# Patient Record
Sex: FEMALE | Race: WHITE | NOT HISPANIC OR LATINO | Employment: OTHER | URBAN - METROPOLITAN AREA
[De-identification: names, ages, dates, MRNs, and addresses within clinical notes are randomized per-mention and may not be internally consistent; named-entity substitution may affect disease eponyms.]

---

## 2023-08-30 PROBLEM — M13.0 POLYARTHRITIS: Status: ACTIVE | Noted: 2023-08-30

## 2023-08-30 PROBLEM — M81.0 POSTMENOPAUSAL OSTEOPOROSIS: Status: ACTIVE | Noted: 2023-08-30

## 2023-08-30 PROBLEM — R07.81 CHEST PAIN, PLEURITIC: Status: ACTIVE | Noted: 2023-08-30

## 2023-08-30 PROBLEM — E78.2 MIXED HYPERLIPIDEMIA: Status: ACTIVE | Noted: 2023-08-30

## 2023-08-30 PROBLEM — N18.9 CHRONIC RENAL FAILURE SYNDROME: Status: ACTIVE | Noted: 2023-08-30

## 2023-08-30 PROBLEM — M70.60 GREATER TROCHANTERIC BURSITIS: Status: ACTIVE | Noted: 2023-08-30

## 2023-08-30 PROBLEM — E87.1 HYPONATREMIA: Status: ACTIVE | Noted: 2023-08-30

## 2023-08-30 PROBLEM — E53.8 VITAMIN B12 DEFICIENCY (NON ANEMIC): Status: ACTIVE | Noted: 2023-08-30

## 2023-08-30 PROBLEM — R42 DIZZINESS: Status: ACTIVE | Noted: 2023-08-30

## 2023-08-30 PROBLEM — E03.8 SUBCLINICAL HYPOTHYROIDISM: Status: ACTIVE | Noted: 2023-08-30

## 2023-08-30 PROBLEM — M25.519 SHOULDER PAIN: Status: ACTIVE | Noted: 2023-08-30

## 2023-08-30 PROBLEM — F03.90 DEMENTIA (MULTI): Status: ACTIVE | Noted: 2023-08-30

## 2023-08-30 PROBLEM — M47.817 SPONDYLOSIS OF LUMBOSACRAL SPINE WITHOUT MYELOPATHY: Status: ACTIVE | Noted: 2023-08-30

## 2023-08-30 PROBLEM — M54.50 LOW BACK PAIN: Status: ACTIVE | Noted: 2023-08-30

## 2023-08-30 PROBLEM — M25.559 HIP PAIN: Status: ACTIVE | Noted: 2023-08-30

## 2023-08-30 PROBLEM — R26.81 UNSTEADY GAIT: Status: ACTIVE | Noted: 2023-08-30

## 2023-08-30 PROBLEM — K21.9 GASTROESOPHAGEAL REFLUX DISEASE WITHOUT ESOPHAGITIS: Status: ACTIVE | Noted: 2023-08-30

## 2023-08-30 PROBLEM — K86.89 PANCREATIC INSUFFICIENCY (HHS-HCC): Status: ACTIVE | Noted: 2023-08-30

## 2023-08-30 PROBLEM — E22.2 SYNDROME OF INAPPROPRIATE SECRETION OF ANTIDIURETIC HORMONE (MULTI): Status: ACTIVE | Noted: 2023-08-30

## 2023-08-30 PROBLEM — I10 ESSENTIAL HYPERTENSION: Status: ACTIVE | Noted: 2023-08-30

## 2023-08-30 PROBLEM — M15.9 GENERALIZED OSTEOARTHRITIS: Status: ACTIVE | Noted: 2023-08-30

## 2023-08-30 PROBLEM — G47.33 OBSTRUCTIVE SLEEP APNEA SYNDROME: Status: ACTIVE | Noted: 2023-08-30

## 2023-08-30 PROBLEM — H35.3130 BILATERAL NONEXUDATIVE AGE-RELATED MACULAR DEGENERATION: Status: ACTIVE | Noted: 2023-08-30

## 2023-08-30 PROBLEM — F41.9 ANXIETY: Status: ACTIVE | Noted: 2023-08-30

## 2023-08-30 PROBLEM — N32.81 OVERACTIVE BLADDER: Status: ACTIVE | Noted: 2023-08-30

## 2023-08-30 PROBLEM — M19.019 ARTHRITIS OF SHOULDER REGION, DEGENERATIVE: Status: ACTIVE | Noted: 2023-08-30

## 2023-08-30 PROBLEM — I50.9 HEART FAILURE (MULTI): Status: ACTIVE | Noted: 2023-08-30

## 2023-08-30 PROBLEM — R33.9 URINARY RETENTION: Status: ACTIVE | Noted: 2023-08-30

## 2023-08-30 PROBLEM — H04.129 DRY EYE: Status: ACTIVE | Noted: 2023-08-30

## 2023-08-30 PROBLEM — M12.819 ROTATOR CUFF TEAR ARTHROPATHY: Status: ACTIVE | Noted: 2023-08-30

## 2023-08-30 PROBLEM — G31.84 MILD COGNITIVE IMPAIRMENT: Status: ACTIVE | Noted: 2023-08-30

## 2023-08-30 PROBLEM — R73.03 PREDIABETES: Status: ACTIVE | Noted: 2023-08-30

## 2023-08-30 PROBLEM — M75.100 ROTATOR CUFF TEAR ARTHROPATHY: Status: ACTIVE | Noted: 2023-08-30

## 2023-08-30 PROBLEM — M81.0 OSTEOPOROSIS: Status: ACTIVE | Noted: 2023-08-30

## 2023-08-30 PROBLEM — M54.9 BACKACHE: Status: ACTIVE | Noted: 2023-08-30

## 2023-08-30 PROBLEM — E55.9 VITAMIN D DEFICIENCY: Status: ACTIVE | Noted: 2023-08-30

## 2023-08-30 PROBLEM — R03.0 ELEVATED BLOOD PRESSURE READING WITHOUT DIAGNOSIS OF HYPERTENSION: Status: ACTIVE | Noted: 2023-08-30

## 2023-08-30 PROBLEM — M19.90 ARTHRITIS: Status: ACTIVE | Noted: 2023-08-30

## 2023-08-30 PROBLEM — I10 BENIGN HYPERTENSION: Status: ACTIVE | Noted: 2023-08-30

## 2023-08-30 RX ORDER — DICLOFENAC SODIUM 10 MG/G
GEL TOPICAL
COMMUNITY
Start: 2014-04-08

## 2023-08-30 RX ORDER — AMLODIPINE BESYLATE 10 MG/1
10 TABLET ORAL DAILY
COMMUNITY
End: 2023-10-19 | Stop reason: SDUPTHER

## 2023-08-30 RX ORDER — LISINOPRIL 5 MG/1
5 TABLET ORAL DAILY
COMMUNITY
Start: 2020-02-24 | End: 2023-10-31 | Stop reason: SDUPTHER

## 2023-08-30 RX ORDER — CHOLECALCIFEROL (VITAMIN D3) 125 MCG
CAPSULE ORAL
COMMUNITY
End: 2023-10-31 | Stop reason: WASHOUT

## 2023-08-30 RX ORDER — AZELASTINE HCL 205.5 UG/1
SPRAY NASAL
COMMUNITY
Start: 2013-06-25 | End: 2023-10-31 | Stop reason: WASHOUT

## 2023-08-30 RX ORDER — ACETAMINOPHEN 325 MG/1
TABLET ORAL
COMMUNITY
End: 2023-10-31 | Stop reason: WASHOUT

## 2023-08-30 RX ORDER — AMLODIPINE BESYLATE 2.5 MG/1
1 TABLET ORAL 2 TIMES DAILY
COMMUNITY
Start: 2016-07-15 | End: 2023-10-19 | Stop reason: SDUPTHER

## 2023-08-30 RX ORDER — TRIAMCINOLONE ACETONIDE 5 MG/G
CREAM TOPICAL
COMMUNITY
Start: 2017-02-07 | End: 2023-12-04 | Stop reason: ALTCHOICE

## 2023-08-30 RX ORDER — BETAMETHASONE DIPROPIONATE 0.5 MG/G
CREAM TOPICAL
COMMUNITY
Start: 2013-08-20 | End: 2023-10-31 | Stop reason: WASHOUT

## 2023-08-30 RX ORDER — METOPROLOL TARTRATE 50 MG/1
1 TABLET ORAL EVERY 12 HOURS
COMMUNITY
End: 2023-10-31 | Stop reason: WASHOUT

## 2023-08-30 RX ORDER — OXYBUTYNIN CHLORIDE 5 MG/1
1 TABLET, EXTENDED RELEASE ORAL DAILY
COMMUNITY
Start: 2016-09-13 | End: 2023-10-31 | Stop reason: WASHOUT

## 2023-08-30 RX ORDER — MIRTAZAPINE 15 MG/1
15 TABLET, FILM COATED ORAL NIGHTLY
COMMUNITY
End: 2023-10-31 | Stop reason: SDUPTHER

## 2023-08-30 RX ORDER — ALENDRONATE SODIUM 70 MG/1
TABLET ORAL
COMMUNITY
Start: 2018-08-07 | End: 2023-10-31 | Stop reason: WASHOUT

## 2023-08-30 RX ORDER — DONEPEZIL HYDROCHLORIDE 10 MG/1
1 TABLET, FILM COATED ORAL DAILY
COMMUNITY
End: 2023-10-31 | Stop reason: SDUPTHER

## 2023-08-30 RX ORDER — AMLODIPINE BESYLATE 5 MG/1
5 TABLET ORAL DAILY
COMMUNITY
End: 2023-10-19 | Stop reason: SDUPTHER

## 2023-08-30 RX ORDER — METFORMIN HYDROCHLORIDE 500 MG/1
TABLET, EXTENDED RELEASE ORAL
COMMUNITY
Start: 2022-04-06 | End: 2023-10-19 | Stop reason: SDUPTHER

## 2023-08-30 RX ORDER — DIAPER,BRIEF,INFANT-TODD,DISP
1 EACH MISCELLANEOUS 2 TIMES DAILY
COMMUNITY
End: 2023-10-31 | Stop reason: ALTCHOICE

## 2023-08-30 RX ORDER — VIT C/E/ZN/COPPR/LUTEIN/ZEAXAN 250MG-90MG
1 CAPSULE ORAL DAILY
COMMUNITY
End: 2023-10-31 | Stop reason: SDUPTHER

## 2023-08-30 RX ORDER — ALPRAZOLAM 0.5 MG/1
0.5 TABLET ORAL NIGHTLY PRN
COMMUNITY
End: 2023-10-31 | Stop reason: SDUPTHER

## 2023-08-30 RX ORDER — ASPIRIN 81 MG/1
1 TABLET ORAL DAILY
COMMUNITY
End: 2023-10-31 | Stop reason: WASHOUT

## 2023-08-30 RX ORDER — OXYCODONE AND ACETAMINOPHEN 5; 325 MG/1; MG/1
1 TABLET ORAL EVERY 4 HOURS PRN
COMMUNITY
End: 2023-10-31 | Stop reason: WASHOUT

## 2023-08-30 RX ORDER — MIRTAZAPINE 7.5 MG/1
1 TABLET, FILM COATED ORAL NIGHTLY
COMMUNITY
End: 2023-10-31 | Stop reason: WASHOUT

## 2023-08-30 RX ORDER — LISINOPRIL 10 MG/1
10 TABLET ORAL DAILY
COMMUNITY
End: 2023-10-31 | Stop reason: WASHOUT

## 2023-08-30 RX ORDER — METHYLPREDNISOLONE 4 MG/1
TABLET ORAL
COMMUNITY
End: 2023-10-31 | Stop reason: WASHOUT

## 2023-08-30 RX ORDER — FLUTICASONE PROPIONATE 50 MCG
SPRAY, SUSPENSION (ML) NASAL
COMMUNITY
Start: 2018-02-27

## 2023-08-30 RX ORDER — GABAPENTIN 100 MG/1
100 CAPSULE ORAL 3 TIMES DAILY
COMMUNITY
End: 2023-10-31 | Stop reason: WASHOUT

## 2023-08-30 RX ORDER — ACETAMINOPHEN 500 MG
TABLET ORAL
COMMUNITY
End: 2023-10-31 | Stop reason: WASHOUT

## 2023-08-30 RX ORDER — ATORVASTATIN CALCIUM 10 MG/1
10 TABLET, FILM COATED ORAL DAILY
COMMUNITY
Start: 2021-04-05 | End: 2023-10-19 | Stop reason: SDUPTHER

## 2023-08-30 RX ORDER — POLYETHYLENE GLYCOL 3350 17 G/17G
POWDER, FOR SOLUTION ORAL
COMMUNITY
End: 2023-10-31 | Stop reason: WASHOUT

## 2023-10-19 ENCOUNTER — PATIENT MESSAGE (OUTPATIENT)
Dept: PRIMARY CARE | Facility: CLINIC | Age: 83
End: 2023-10-19
Payer: MEDICARE

## 2023-10-19 DIAGNOSIS — I10 ESSENTIAL HYPERTENSION: ICD-10-CM

## 2023-10-19 DIAGNOSIS — E78.2 MIXED HYPERLIPIDEMIA: ICD-10-CM

## 2023-10-19 DIAGNOSIS — E11.69 TYPE 2 DIABETES MELLITUS WITH OTHER SPECIFIED COMPLICATION, WITHOUT LONG-TERM CURRENT USE OF INSULIN (MULTI): Primary | ICD-10-CM

## 2023-10-19 RX ORDER — METFORMIN HYDROCHLORIDE 500 MG/1
TABLET, EXTENDED RELEASE ORAL
Qty: 90 TABLET | Refills: 3 | Status: SHIPPED | OUTPATIENT
Start: 2023-10-19 | End: 2023-10-31 | Stop reason: WASHOUT

## 2023-10-19 RX ORDER — AMLODIPINE BESYLATE 5 MG/1
5 TABLET ORAL DAILY
Qty: 90 TABLET | Refills: 3 | Status: SHIPPED | OUTPATIENT
Start: 2023-10-19 | End: 2023-10-31 | Stop reason: SDUPTHER

## 2023-10-19 RX ORDER — ATORVASTATIN CALCIUM 10 MG/1
10 TABLET, FILM COATED ORAL DAILY
Qty: 90 TABLET | Refills: 3 | Status: SHIPPED | OUTPATIENT
Start: 2023-10-19 | End: 2023-10-31 | Stop reason: SDUPTHER

## 2023-10-19 NOTE — TELEPHONE ENCOUNTER
From: Liv Saucedo  To: Reese Short MD  Sent: 10/19/2023 9:45 AM EDT  Subject: Rx renewal    Please renew Metformin 500mg, Atorvastatin 10mg, and Amlodipine 5mg. TY.

## 2023-10-31 ENCOUNTER — OFFICE VISIT (OUTPATIENT)
Dept: PRIMARY CARE | Facility: CLINIC | Age: 83
End: 2023-10-31
Payer: MEDICARE

## 2023-10-31 VITALS
TEMPERATURE: 98 F | DIASTOLIC BLOOD PRESSURE: 60 MMHG | HEART RATE: 60 BPM | OXYGEN SATURATION: 97 % | BODY MASS INDEX: 25.24 KG/M2 | SYSTOLIC BLOOD PRESSURE: 116 MMHG | WEIGHT: 138 LBS

## 2023-10-31 DIAGNOSIS — I10 ESSENTIAL HYPERTENSION: ICD-10-CM

## 2023-10-31 DIAGNOSIS — F03.94 DEMENTIA WITH ANXIETY, UNSPECIFIED DEMENTIA SEVERITY, UNSPECIFIED DEMENTIA TYPE (MULTI): ICD-10-CM

## 2023-10-31 DIAGNOSIS — K21.9 GASTROESOPHAGEAL REFLUX DISEASE WITHOUT ESOPHAGITIS: ICD-10-CM

## 2023-10-31 DIAGNOSIS — M15.9 PRIMARY OSTEOARTHRITIS INVOLVING MULTIPLE JOINTS: ICD-10-CM

## 2023-10-31 DIAGNOSIS — G47.33 OBSTRUCTIVE SLEEP APNEA SYNDROME: ICD-10-CM

## 2023-10-31 DIAGNOSIS — F41.9 ANXIETY: ICD-10-CM

## 2023-10-31 DIAGNOSIS — E55.9 VITAMIN D DEFICIENCY: ICD-10-CM

## 2023-10-31 DIAGNOSIS — E78.2 MIXED HYPERLIPIDEMIA: ICD-10-CM

## 2023-10-31 DIAGNOSIS — R73.03 PREDIABETES: ICD-10-CM

## 2023-10-31 DIAGNOSIS — Z01.89 ENCOUNTER FOR ROUTINE LABORATORY TESTING: ICD-10-CM

## 2023-10-31 DIAGNOSIS — Z00.00 ANNUAL PHYSICAL EXAM: Primary | ICD-10-CM

## 2023-10-31 DIAGNOSIS — E53.8 VITAMIN B12 DEFICIENCY (NON ANEMIC): ICD-10-CM

## 2023-10-31 DIAGNOSIS — E03.8 SUBCLINICAL HYPOTHYROIDISM: ICD-10-CM

## 2023-10-31 DIAGNOSIS — Z71.89 COUNSELING REGARDING ADVANCED CARE PLANNING AND GOALS OF CARE: ICD-10-CM

## 2023-10-31 DIAGNOSIS — Z23 ENCOUNTER FOR IMMUNIZATION: ICD-10-CM

## 2023-10-31 PROBLEM — M15.0 PRIMARY OSTEOARTHRITIS INVOLVING MULTIPLE JOINTS: Status: ACTIVE | Noted: 2023-10-31

## 2023-10-31 PROCEDURE — G0439 PPPS, SUBSEQ VISIT: HCPCS | Performed by: STUDENT IN AN ORGANIZED HEALTH CARE EDUCATION/TRAINING PROGRAM

## 2023-10-31 PROCEDURE — 1036F TOBACCO NON-USER: CPT | Performed by: STUDENT IN AN ORGANIZED HEALTH CARE EDUCATION/TRAINING PROGRAM

## 2023-10-31 PROCEDURE — 1170F FXNL STATUS ASSESSED: CPT | Performed by: STUDENT IN AN ORGANIZED HEALTH CARE EDUCATION/TRAINING PROGRAM

## 2023-10-31 PROCEDURE — 1160F RVW MEDS BY RX/DR IN RCRD: CPT | Performed by: STUDENT IN AN ORGANIZED HEALTH CARE EDUCATION/TRAINING PROGRAM

## 2023-10-31 PROCEDURE — 3078F DIAST BP <80 MM HG: CPT | Performed by: STUDENT IN AN ORGANIZED HEALTH CARE EDUCATION/TRAINING PROGRAM

## 2023-10-31 PROCEDURE — 1159F MED LIST DOCD IN RCRD: CPT | Performed by: STUDENT IN AN ORGANIZED HEALTH CARE EDUCATION/TRAINING PROGRAM

## 2023-10-31 PROCEDURE — 3074F SYST BP LT 130 MM HG: CPT | Performed by: STUDENT IN AN ORGANIZED HEALTH CARE EDUCATION/TRAINING PROGRAM

## 2023-10-31 PROCEDURE — 1126F AMNT PAIN NOTED NONE PRSNT: CPT | Performed by: STUDENT IN AN ORGANIZED HEALTH CARE EDUCATION/TRAINING PROGRAM

## 2023-10-31 RX ORDER — VIT C/E/ZN/COPPR/LUTEIN/ZEAXAN 250MG-90MG
25 CAPSULE ORAL DAILY
Start: 2023-10-31

## 2023-10-31 RX ORDER — METFORMIN HYDROCHLORIDE 500 MG/1
500 TABLET, EXTENDED RELEASE ORAL
Start: 2023-10-31 | End: 2024-02-02 | Stop reason: SDUPTHER

## 2023-10-31 RX ORDER — MIRTAZAPINE 15 MG/1
15 TABLET, FILM COATED ORAL NIGHTLY
Start: 2023-10-31 | End: 2024-03-18 | Stop reason: WASHOUT

## 2023-10-31 RX ORDER — ATORVASTATIN CALCIUM 10 MG/1
10 TABLET, FILM COATED ORAL DAILY
Start: 2023-10-31 | End: 2024-02-02 | Stop reason: SDUPTHER

## 2023-10-31 RX ORDER — DONEPEZIL HYDROCHLORIDE 10 MG/1
10 TABLET, FILM COATED ORAL DAILY
Start: 2023-10-31 | End: 2024-03-25

## 2023-10-31 RX ORDER — LISINOPRIL 5 MG/1
5 TABLET ORAL DAILY
Start: 2023-10-31 | End: 2024-05-28

## 2023-10-31 RX ORDER — ALPRAZOLAM 0.5 MG/1
0.5 TABLET ORAL NIGHTLY PRN
Start: 2023-10-31

## 2023-10-31 RX ORDER — MEMANTINE HYDROCHLORIDE 10 MG/1
10 TABLET ORAL DAILY
COMMUNITY
Start: 2023-09-18 | End: 2023-10-31 | Stop reason: SDUPTHER

## 2023-10-31 RX ORDER — AMLODIPINE BESYLATE 5 MG/1
5 TABLET ORAL DAILY
Start: 2023-10-31 | End: 2024-02-02 | Stop reason: SDUPTHER

## 2023-10-31 RX ORDER — MEMANTINE HYDROCHLORIDE 10 MG/1
10 TABLET ORAL DAILY
Start: 2023-10-31 | End: 2024-05-07

## 2023-10-31 ASSESSMENT — PAIN SCALES - GENERAL: PAINLEVEL: 0-NO PAIN

## 2023-10-31 ASSESSMENT — ACTIVITIES OF DAILY LIVING (ADL)
DRESSING: INDEPENDENT
MANAGING_FINANCES: TOTAL CARE
GROCERY_SHOPPING: TOTAL CARE
TAKING_MEDICATION: TOTAL CARE
BATHING: INDEPENDENT
DOING_HOUSEWORK: NEEDS ASSISTANCE

## 2023-10-31 NOTE — PROGRESS NOTES
[facility]: [facility]  MEDICARE WELLNESS EXAM      Liv Saucedo is a 83 y.o. female that is presenting today for Annual Exam (CPE).    Assessment/Plan    Diagnoses and all orders for this visit:  Annual physical exam  Encounter for routine laboratory testing  Comments:  Patient is due for some nonfasting labwork. Ordered today.  Will order fasting labwork for the patient's next visit.  Orders:  -     CBC; Future  -     Basic Metabolic Panel; Future  -     Hepatic Function Panel; Future  -     TSH with reflex to Free T4 if abnormal; Future  -     CBC; Future  -     Basic Metabolic Panel; Future  -     Hepatic Function Panel; Future  -     Lipid Panel; Future  -     Hemoglobin A1C; Future  -     Vitamin D 25-Hydroxy,Total (for eval of Vitamin D levels); Future  -     TSH with reflex to Free T4 if abnormal; Future  -     Vitamin B12; Future  -     Folate; Future  Encounter for immunization  Comments:  Not due for any at this time.  Counseling regarding advanced care planning and goals of care  Dementia with anxiety, unspecified dementia severity, unspecified dementia type (CMS/[facility])  Comments:  Well-controlled. No changes at this time.  Orders:  -     donepezil (Aricept) 10 mg tablet; Take 1 tablet (10 mg) by mouth once daily.  -     memantine (Namenda) 10 mg tablet; Take 1 tablet (10 mg) by mouth once daily.  -     mirtazapine (Remeron) 15 mg tablet; Take 1 tablet (15 mg) by mouth once daily at bedtime.  Essential hypertension  Comments:  Well-controlled. No changes at this time.  Orders:  -     Basic Metabolic Panel; Future  -     amLODIPine (Norvasc) 5 mg tablet; Take 1 tablet (5 mg) by mouth once daily.  -     lisinopril 5 mg tablet; Take 1 tablet (5 mg) by mouth once daily.  -     Basic Metabolic Panel; Future  Prediabetes  -     Hemoglobin A1C; Future  -     metFORMIN  mg 24 hr tablet; Take 1 tablet (500 mg) by mouth once daily in the evening. Take with meals.  Mixed  hyperlipidemia  -     Hepatic Function Panel; Future  -     atorvastatin (Lipitor) 10 mg tablet; Take 1 tablet (10 mg) by mouth once daily.  -     Hepatic Function Panel; Future  -     Lipid Panel; Future  Subclinical hypothyroidism  -     TSH with reflex to Free T4 if abnormal; Future  -     TSH with reflex to Free T4 if abnormal; Future  Gastroesophageal reflux disease without esophagitis  Comments:  Well-controlled. No changes at this time.  Obstructive sleep apnea syndrome  -     CBC; Future  -     CBC; Future  Primary osteoarthritis involving multiple joints  Comments:  Well-controlled. No changes at this time.  Vitamin B12 deficiency (non anemic)  -     Vitamin B12; Future  -     Folate; Future  Vitamin D deficiency  -     cholecalciferol (Vitamin D-3) 25 MCG (1000 UT) capsule; Take 1 capsule (25 mcg) by mouth once daily.  -     Vitamin D 25-Hydroxy,Total (for eval of Vitamin D levels); Future  Anxiety  -     ALPRAZolam (Xanax) 0.5 mg tablet; Take 1 tablet (0.5 mg) by mouth as needed at bedtime for anxiety.  -     mirtazapine (Remeron) 15 mg tablet; Take 1 tablet (15 mg) by mouth once daily at bedtime.    ADVANCED CARE PLANNING  Advanced Care Planning was discussed with patient:  The patient has an active advanced care plan on file. The patient has an active surrogate decision-maker on file.  The patient was encouraged to ensure that any/all documentation is accurate and up to date, and that our office be provided a copy in the event that anything changes.    ACTIVITIES OF DAILY LIVING  Basic ADLs:  Bathing: Independent, Dressing: Independent, Toileting: Independent, Transferring: Independent, Continence: Independent, Feeding: Independent.    Instrumental ADLs:  Ability to use phone: Independent, Shopping: Requires Assistance, Cooking: Requires Assistance, House-keeping: Requires Assistance, Laundry: Requires Assistance, Transportation: Completely Dependent, Medication Management: Completely Dependent, Finance  Management: Completely Dependent.    Subjective   - The patient feels that she is noticing an improvement in her memory since neurology started the memantine; however, the patient's spouse does not notice this. Spouse states that the patient is not really getting better but also not really getting worse.  - The patient otherwise feels well and denies any acute symptoms or concerns at this time.  - The patient denies any changes or progression of their chronic medical problems.  - The patient denies any problems or concerns with their medications.      Review of Systems   Unable to perform ROS: Dementia     Objective   Vitals:    10/31/23 1029   BP: 116/60   Pulse: 60   Temp: 36.7 °C (98 °F)   SpO2: 97%      Body mass index is 25.24 kg/m².  Physical Exam  Vitals and nursing note reviewed.   Constitutional:       General: She is not in acute distress.     Appearance: Normal appearance. She is not ill-appearing.   HENT:      Head: Normocephalic and atraumatic.      Right Ear: Tympanic membrane, ear canal and external ear normal. There is no impacted cerumen.      Left Ear: Tympanic membrane, ear canal and external ear normal. There is no impacted cerumen.      Nose: Nose normal.      Mouth/Throat:      Mouth: Mucous membranes are moist.      Pharynx: Oropharynx is clear. No oropharyngeal exudate or posterior oropharyngeal erythema.   Eyes:      General: No scleral icterus.        Right eye: No discharge.         Left eye: No discharge.      Extraocular Movements: Extraocular movements intact.      Conjunctiva/sclera: Conjunctivae normal.      Pupils: Pupils are equal, round, and reactive to light.   Neck:      Vascular: No carotid bruit.   Cardiovascular:      Rate and Rhythm: Normal rate and regular rhythm.      Pulses: Normal pulses.      Heart sounds: Normal heart sounds. No murmur heard.     No friction rub. No gallop.   Pulmonary:      Effort: Pulmonary effort is normal. No respiratory distress.      Breath  "sounds: Normal breath sounds.   Abdominal:      General: Abdomen is flat. Bowel sounds are normal. There is no distension.      Palpations: Abdomen is soft.      Tenderness: There is no abdominal tenderness.      Hernia: No hernia is present.   Musculoskeletal:         General: No swelling or tenderness. Normal range of motion.   Lymphadenopathy:      Cervical: No cervical adenopathy.   Skin:     General: Skin is warm and dry.      Capillary Refill: Capillary refill takes less than 2 seconds.      Coloration: Skin is not jaundiced.      Findings: No rash.   Neurological:      General: No focal deficit present.      Mental Status: She is alert. Mental status is at baseline.   Psychiatric:         Mood and Affect: Mood normal.         Behavior: Behavior normal.       Diagnostic Results   Lab Results   Component Value Date    GLUCOSE 105 (H) 05/08/2023    CALCIUM 9.7 05/08/2023     05/08/2023    K 4.3 05/08/2023    CO2 23 (L) 05/08/2023    CL 98 05/08/2023    BUN 14 05/08/2023    CREATININE 0.9 05/08/2023     Lab Results   Component Value Date    ALT 16 05/08/2023    AST 18 05/08/2023    ALKPHOS 77 05/08/2023    BILITOT 0.5 05/08/2023     Lab Results   Component Value Date    WBC 6.2 05/08/2023    HGB 13.5 05/08/2023    HCT 39.9 05/08/2023    MCV 88.1 05/08/2023     05/08/2023     Lab Results   Component Value Date    CHOL 174 05/08/2023    CHOL 179 03/31/2022    CHOL 246 (H) 03/26/2021     Lab Results   Component Value Date    HDL 74 05/08/2023    HDL 73 03/31/2022    HDL 91 03/26/2021     Lab Results   Component Value Date    LDLCALC 76 05/08/2023    LDLCALC 86 03/31/2022    LDLCALC 128 03/26/2021     Lab Results   Component Value Date    TRIG 119 05/08/2023    TRIG 98 03/31/2022    TRIG 135 03/26/2021     No components found for: \"CHOLHDL\"  Lab Results   Component Value Date    HGBA1C 6.0 05/08/2023     Other labs not included in the list above reviewed either before or during this encounter.    History " "  No past medical history on file.  Past Surgical History:   Procedure Laterality Date    CT ABDOMEN ANGIOGRAM W AND/OR WO IV CONTRAST  6/19/2013    CT ABDOMEN ANGIOGRAM W AND/OR WO IV CONTRAST [facility]    TOTAL SHOULDER ARTHROPLASTY  07/15/2016    Shoulder Arthroplasty Total Shoulder Replacement     Family History   Problem Relation Name Age of Onset    Breast cancer Mother          cause of death    Multiple sclerosis Mother      Other (bladder cancer) Father          cause of death    Liver disease Sister          End stage    Obesity Sister      Diabetes Sister      Hypothyroidism Sister      Hashimoto's thyroiditis Sister      Osteoporosis Sister      Dystonia Sister          Cervical    Other (Vascular dementia) Other          Father's second cousin    Other (lewy body dementia) Other          Father's side    Breast cancer Other          sister     Social History     Socioeconomic History    Marital status:      Spouse name: Not on file    Number of children: Not on file    Years of education: Not on file    Highest education level: Not on file   Occupational History    Not on file   Tobacco Use    Smoking status: Never    Smokeless tobacco: Never   Substance and Sexual Activity    Alcohol use: Yes     Comment: occasionally    Drug use: Never    Sexual activity: Not on file   Other Topics Concern    Not on file   Social History Narrative    Not on file     Social Determinants of Health     Financial Resource Strain: Not on file   Food Insecurity: Not on file   Transportation Needs: Not on file   Physical Activity: Not on file   Stress: Not on file   Social Connections: Not on file   Intimate Partner Violence: Not on file   Housing Stability: Not on file     Allergies   Allergen Reactions    Lorazepam Hallucinations    Memantine Unknown    Naproxen Unknown    Nsaids (Non-Steroidal Anti-Inflammatory Drug) Other     \"hyponatremia    Sulfa (Sulfonamide Antibiotics) Other     mental status change "    Tetracyclines Other and Unknown     Nausea      Current Outpatient Medications on File Prior to Visit   Medication Sig Dispense Refill    alpha-D-galactosidase (BEANO ORAL) 1 tablets Orally three times a day with each meal      diclofenac sodium (Voltaren) 1 % gel gel as directed Externally Twice a day      fluticasone (Flonase) 50 mcg/actuation nasal spray 2 sprays Nasally Once a day      lubricating eye drops (polyvinyl alcohol-povidon,PF,) ophthalmic solution 2 drops each eye Ophthalmic bid      triamcinolone (Kenalog) 0.5 % cream 1 application to affected area Externally Twice a day prn      [DISCONTINUED] ALPRAZolam (Xanax) 0.5 mg tablet Take 1 tablet (0.5 mg) by mouth as needed at bedtime.  1 tablet oral three times a day PRN ANXIETY      [DISCONTINUED] amLODIPine (Norvasc) 5 mg tablet Take 1 tablet (5 mg) by mouth once daily. 90 tablet 3    [DISCONTINUED] atorvastatin (Lipitor) 10 mg tablet Take 1 tablet (10 mg) by mouth once daily. 90 tablet 3    [DISCONTINUED] calcium citrate-vitamin D3 250 mg-5 mcg (200 unit) tablet Take 1 tablet by mouth 2 times a day.      [DISCONTINUED] cholecalciferol (Vitamin D-3) 25 MCG (1000 UT) capsule Take 1 capsule (25 mcg) by mouth once daily.      [DISCONTINUED] donepezil (Aricept) 10 mg tablet Take 1 tablet (10 mg) by mouth once daily.      [DISCONTINUED] lisinopril 5 mg tablet Take 1 tablet (5 mg) by mouth once daily.      [DISCONTINUED] memantine (Namenda) 10 mg tablet Take 1 tablet (10 mg) by mouth once daily.      [DISCONTINUED] mirtazapine (Remeron) 15 mg tablet Take 1 tablet (15 mg) by mouth once daily at bedtime.      [DISCONTINUED] acetaminophen (Tylenol Extra Strength) 500 mg tablet 2 tablets as needed Orally Three times a day      [DISCONTINUED] acetaminophen (Tylenol) 325 mg tablet       [DISCONTINUED] alendronate (Fosamax) 70 mg tablet TAKE 1 TABLET ONCE WEEKLY.      [DISCONTINUED] aspirin 81 mg EC tablet Take 1 tablet (81 mg) by mouth once daily.       [DISCONTINUED] azelastine (Astepro Allergy) 205.5 mcg (0.15 %) spray,non-aerosol Astepro 0.15 % SOLN   Quantity: 120  Refills: 0        Start : 25-Jun-2013   Active      [DISCONTINUED] betamethasone dipropionate 0.05 % cream APPLY SPARINGLY TO AFFECTED AREA(S) ONCE DAILY      [DISCONTINUED] carboxymethylcellulose-glycern 1-0.9 % drops,gel Refresh Optive 1-0.9 % Ophthalmic Gel   Refills: 0        Start : 1-Aug-2018   Active      [DISCONTINUED] gabapentin (Neurontin) 100 mg capsule Take 1 capsule (100 mg) by mouth 3 times a day.      [DISCONTINUED] lactase (Lactaid) 3,000 unit tablet 1 tablet Orally three times a day with each meal      [DISCONTINUED] lisinopril 10 mg tablet Take 1 tablet (10 mg) by mouth once daily.      [DISCONTINUED] metFORMIN  mg 24 hr tablet 1 tablet with a meal Orally Once a day 90 tablet 3    [DISCONTINUED] methylPREDNISolone (Medrol, Bob,) 4 mg tablets 1 package oral daily      [DISCONTINUED] metoprolol tartrate (Lopressor) 50 mg tablet Take 1 tablet by mouth every 12 hours.      [DISCONTINUED] mirtazapine (Remeron) 7.5 mg tablet Take 1 tablet (7.5 mg) by mouth once daily at bedtime.      [DISCONTINUED] oxybutynin XL (Ditropan-XL) 5 mg 24 hr tablet Take 1 tablet (5 mg) by mouth once daily.      [DISCONTINUED] oxyCODONE-acetaminophen (Percocet) 5-325 mg tablet Take 1 tablet by mouth every 4 hours if needed for moderate pain (4 - 6).      [DISCONTINUED] polyethylene glycol (Miralax) 17 gram/dose powder as directed Orally       No current facility-administered medications on file prior to visit.     Immunization History   Administered Date(s) Administered    Flu vaccine, quadrivalent, high-dose, preservative free, age 65y+ (FLUZONE) 09/16/2021, 09/30/2022, 09/13/2023    Hep A, Unspecified 12/09/2005, 06/09/2006    Hepatitis A vaccine, pediatric/adolescent (HAVRIX, VAQTA) 01/25/2005    Influenza, High Dose Seasonal, Preservative Free 11/07/2016, 10/25/2017, 09/27/2018, 09/24/2019     Influenza, seasonal, injectable 10/22/2010, 10/19/2012, 10/28/2013, 10/30/2014    Influenza, trivalent, adjuvanted 09/10/2020    Moderna COVID-19 vaccine, Fall 2023, 12 yeasrs and older (50mcg/0.5mL) 10/11/2023    Moderna COVID-19 vaccine, bivalent, blue cap/gray label *Check age/dose* 09/14/2022    Moderna SARS-CoV-2 Vaccination 01/26/2021, 02/23/2021, 10/27/2021, 03/31/2022    Pneumococcal conjugate vaccine, 13-valent (PREVNAR 13) 12/02/2016    Pneumococcal polysaccharide vaccine, 23-valent, age 2 years and older (PNEUMOVAX 23) 11/10/2011    Tdap vaccine, age 7 year and older (BOOSTRIX) 07/30/2018    Zoster vaccine, recombinant, adult (SHINGRIX) 12/07/2018, 08/16/2019, 08/23/2019     Patient's medical history was reviewed and updated either before or during this encounter.    Reese Short MD

## 2023-10-31 NOTE — PATIENT INSTRUCTIONS
Diagnoses and all orders for this visit:  Annual physical exam  Encounter for routine laboratory testing  Comments:  Patient is due for some nonfasting labwork. Ordered today.  Will order fasting labwork for the patient's next visit.  Orders:  -     CBC; Future  -     Basic Metabolic Panel; Future  -     Hepatic Function Panel; Future  -     TSH with reflex to Free T4 if abnormal; Future  -     CBC; Future  -     Basic Metabolic Panel; Future  -     Hepatic Function Panel; Future  -     Lipid Panel; Future  -     Hemoglobin A1C; Future  -     Vitamin D 25-Hydroxy,Total (for eval of Vitamin D levels); Future  -     TSH with reflex to Free T4 if abnormal; Future  -     Vitamin B12; Future  -     Folate; Future  Encounter for immunization  Comments:  Not due for any at this time.  Counseling regarding advanced care planning and goals of care  Dementia with anxiety, unspecified dementia severity, unspecified dementia type (CMS/[facility])  Comments:  Well-controlled. No changes at this time.  Orders:  -     donepezil (Aricept) 10 mg tablet; Take 1 tablet (10 mg) by mouth once daily.  -     memantine (Namenda) 10 mg tablet; Take 1 tablet (10 mg) by mouth once daily.  -     mirtazapine (Remeron) 15 mg tablet; Take 1 tablet (15 mg) by mouth once daily at bedtime.  -     Follow Up In Primary Care; Future  Essential hypertension  Comments:  Well-controlled. No changes at this time.  Orders:  -     Basic Metabolic Panel; Future  -     amLODIPine (Norvasc) 5 mg tablet; Take 1 tablet (5 mg) by mouth once daily.  -     lisinopril 5 mg tablet; Take 1 tablet (5 mg) by mouth once daily.  -     Basic Metabolic Panel; Future  -     Follow Up In Primary Care; Future  Prediabetes  -     Hemoglobin A1C; Future  -     metFORMIN  mg 24 hr tablet; Take 1 tablet (500 mg) by mouth once daily in the evening. Take with meals.  Mixed hyperlipidemia  -     Hepatic Function Panel; Future  -     atorvastatin (Lipitor) 10 mg tablet; Take 1 tablet  (10 mg) by mouth once daily.  -     Hepatic Function Panel; Future  -     Lipid Panel; Future  Subclinical hypothyroidism  -     TSH with reflex to Free T4 if abnormal; Future  -     TSH with reflex to Free T4 if abnormal; Future  Gastroesophageal reflux disease without esophagitis  Comments:  Well-controlled. No changes at this time.  Obstructive sleep apnea syndrome  -     CBC; Future  -     CBC; Future  Primary osteoarthritis involving multiple joints  Comments:  Well-controlled. No changes at this time.  Vitamin B12 deficiency (non anemic)  -     Vitamin B12; Future  -     Folate; Future  Vitamin D deficiency  -     cholecalciferol (Vitamin D-3) 25 MCG (1000 UT) capsule; Take 1 capsule (25 mcg) by mouth once daily.  -     Vitamin D 25-Hydroxy,Total (for eval of Vitamin D levels); Future  Anxiety  -     ALPRAZolam (Xanax) 0.5 mg tablet; Take 1 tablet (0.5 mg) by mouth as needed at bedtime for anxiety.  -     mirtazapine (Remeron) 15 mg tablet; Take 1 tablet (15 mg) by mouth once daily at bedtime.

## 2023-11-02 ENCOUNTER — LAB (OUTPATIENT)
Dept: LAB | Facility: LAB | Age: 83
End: 2023-11-02
Payer: MEDICARE

## 2023-11-02 DIAGNOSIS — E78.2 MIXED HYPERLIPIDEMIA: ICD-10-CM

## 2023-11-02 DIAGNOSIS — I10 ESSENTIAL HYPERTENSION: ICD-10-CM

## 2023-11-02 DIAGNOSIS — G47.33 OBSTRUCTIVE SLEEP APNEA SYNDROME: ICD-10-CM

## 2023-11-02 DIAGNOSIS — E03.8 SUBCLINICAL HYPOTHYROIDISM: ICD-10-CM

## 2023-11-02 DIAGNOSIS — Z01.89 ENCOUNTER FOR ROUTINE LABORATORY TESTING: ICD-10-CM

## 2023-11-02 LAB
ALBUMIN SERPL-MCNC: 4.8 G/DL (ref 3.5–5)
ALP BLD-CCNC: 71 U/L (ref 35–125)
ALT SERPL-CCNC: 15 U/L (ref 5–40)
ANION GAP SERPL CALC-SCNC: 11 MMOL/L
AST SERPL-CCNC: 17 U/L (ref 5–40)
BILIRUB DIRECT SERPL-MCNC: <0.2 MG/DL (ref 0–0.2)
BILIRUB SERPL-MCNC: 0.5 MG/DL (ref 0.1–1.2)
BUN SERPL-MCNC: 15 MG/DL (ref 8–25)
CALCIUM SERPL-MCNC: 10.3 MG/DL (ref 8.5–10.4)
CHLORIDE SERPL-SCNC: 92 MMOL/L (ref 97–107)
CO2 SERPL-SCNC: 28 MMOL/L (ref 24–31)
CREAT SERPL-MCNC: 0.9 MG/DL (ref 0.4–1.6)
ERYTHROCYTE [DISTWIDTH] IN BLOOD BY AUTOMATED COUNT: 12.7 % (ref 11.5–14.5)
GFR SERPL CREATININE-BSD FRML MDRD: 64 ML/MIN/1.73M*2
GLUCOSE SERPL-MCNC: 88 MG/DL (ref 65–99)
HCT VFR BLD AUTO: 39.1 % (ref 36–46)
HGB BLD-MCNC: 13.1 G/DL (ref 12–16)
MCH RBC QN AUTO: 29 PG (ref 26–34)
MCHC RBC AUTO-ENTMCNC: 33.5 G/DL (ref 32–36)
MCV RBC AUTO: 87 FL (ref 80–100)
NRBC BLD-RTO: 0 /100 WBCS (ref 0–0)
PLATELET # BLD AUTO: 297 X10*3/UL (ref 150–450)
POTASSIUM SERPL-SCNC: 4.7 MMOL/L (ref 3.4–5.1)
PROT SERPL-MCNC: 6.7 G/DL (ref 5.9–7.9)
RBC # BLD AUTO: 4.51 X10*6/UL (ref 4–5.2)
SODIUM SERPL-SCNC: 131 MMOL/L (ref 133–145)
TSH SERPL DL<=0.05 MIU/L-ACNC: 2.13 MIU/L (ref 0.27–4.2)
WBC # BLD AUTO: 6.6 X10*3/UL (ref 4.4–11.3)

## 2023-11-02 PROCEDURE — 36415 COLL VENOUS BLD VENIPUNCTURE: CPT

## 2023-11-02 PROCEDURE — 82248 BILIRUBIN DIRECT: CPT

## 2023-11-02 PROCEDURE — 85027 COMPLETE CBC AUTOMATED: CPT

## 2023-11-02 PROCEDURE — 80053 COMPREHEN METABOLIC PANEL: CPT

## 2023-11-02 PROCEDURE — 84443 ASSAY THYROID STIM HORMONE: CPT

## 2023-12-04 ENCOUNTER — OFFICE VISIT (OUTPATIENT)
Dept: SLEEP MEDICINE | Facility: CLINIC | Age: 83
End: 2023-12-04
Payer: MEDICARE

## 2023-12-04 VITALS
DIASTOLIC BLOOD PRESSURE: 68 MMHG | SYSTOLIC BLOOD PRESSURE: 104 MMHG | OXYGEN SATURATION: 95 % | HEART RATE: 68 BPM | HEIGHT: 63 IN | WEIGHT: 136 LBS | BODY MASS INDEX: 24.1 KG/M2

## 2023-12-04 DIAGNOSIS — G47.33 OBSTRUCTIVE SLEEP APNEA (ADULT) (PEDIATRIC): Primary | ICD-10-CM

## 2023-12-04 PROCEDURE — 1126F AMNT PAIN NOTED NONE PRSNT: CPT | Performed by: INTERNAL MEDICINE

## 2023-12-04 PROCEDURE — 1159F MED LIST DOCD IN RCRD: CPT | Performed by: INTERNAL MEDICINE

## 2023-12-04 PROCEDURE — 3074F SYST BP LT 130 MM HG: CPT | Performed by: INTERNAL MEDICINE

## 2023-12-04 PROCEDURE — 99213 OFFICE O/P EST LOW 20 MIN: CPT | Performed by: INTERNAL MEDICINE

## 2023-12-04 PROCEDURE — 1160F RVW MEDS BY RX/DR IN RCRD: CPT | Performed by: INTERNAL MEDICINE

## 2023-12-04 PROCEDURE — 1036F TOBACCO NON-USER: CPT | Performed by: INTERNAL MEDICINE

## 2023-12-04 PROCEDURE — 3078F DIAST BP <80 MM HG: CPT | Performed by: INTERNAL MEDICINE

## 2023-12-04 ASSESSMENT — SLEEP AND FATIGUE QUESTIONNAIRES
HOW LIKELY ARE YOU TO NOD OFF OR FALL ASLEEP WHILE SITTING QUIETLY AFTER LUNCH WITHOUT ALCOHOL: MODERATE CHANCE OF DOZING
ESS-CHAD TOTAL SCORE: 10
HOW LIKELY ARE YOU TO NOD OFF OR FALL ASLEEP WHEN YOU ARE A PASSENGER IN A CAR FOR AN HOUR WITHOUT A BREAK: HIGH CHANCE OF DOZING
HOW LIKELY ARE YOU TO NOD OFF OR FALL ASLEEP IN A CAR, WHILE STOPPED FOR A FEW MINUTES IN TRAFFIC: WOULD NEVER DOZE
SITING INACTIVE IN A PUBLIC PLACE LIKE A CLASS ROOM OR A MOVIE THEATER: WOULD NEVER DOZE
HOW LIKELY ARE YOU TO NOD OFF OR FALL ASLEEP WHILE SITTING AND READING: MODERATE CHANCE OF DOZING
HOW LIKELY ARE YOU TO NOD OFF OR FALL ASLEEP WHILE LYING DOWN TO REST IN THE AFTERNOON WHEN CIRCUMSTANCES PERMIT: HIGH CHANCE OF DOZING
HOW LIKELY ARE YOU TO NOD OFF OR FALL ASLEEP WHILE WATCHING TV: WOULD NEVER DOZE
HOW LIKELY ARE YOU TO NOD OFF OR FALL ASLEEP WHILE SITTING AND TALKING TO SOMEONE: WOULD NEVER DOZE

## 2023-12-04 ASSESSMENT — PAIN SCALES - GENERAL: PAINLEVEL: 0-NO PAIN

## 2023-12-04 NOTE — ASSESSMENT & PLAN NOTE
- Liv Saucedo  has sleep apnea and requires treatment.  - Liv Saucedo demonstrates good compliance and benefit from PAP therapy  - Continue CPAP 8 cmH20 through [facility].  - Order for renewal of PAP supplies placed   -Order to d.a.w. [employer]Med P10 small mask and chinstrap  -Less nocturia and nocturnal wandering has reduced significantly

## 2024-02-02 DIAGNOSIS — I10 ESSENTIAL HYPERTENSION: ICD-10-CM

## 2024-02-02 DIAGNOSIS — E78.2 MIXED HYPERLIPIDEMIA: ICD-10-CM

## 2024-02-02 DIAGNOSIS — R73.03 PREDIABETES: ICD-10-CM

## 2024-02-02 NOTE — TELEPHONE ENCOUNTER
----- Message from Savannah Magaña sent at 2/2/2024 11:22 AM EST -----  Regarding: FW: Rx renewal  Contact: [phone]    ----- Message -----  From: Liv Saucedo  Sent: 2/2/2024  10:07 AM EST  To: #  Subject: Rx renewal                                       Please renew Rx - Metformin 500mg, Atorvastatin 10mg, and Amlodipine 5mg. TY

## 2024-02-04 RX ORDER — METFORMIN HYDROCHLORIDE 500 MG/1
500 TABLET, EXTENDED RELEASE ORAL
Qty: 90 TABLET | Refills: 1 | Status: SHIPPED | OUTPATIENT
Start: 2024-02-04

## 2024-02-04 RX ORDER — AMLODIPINE BESYLATE 5 MG/1
5 TABLET ORAL DAILY
Qty: 90 TABLET | Refills: 1 | Status: SHIPPED | OUTPATIENT
Start: 2024-02-04 | End: 2024-05-28

## 2024-02-04 RX ORDER — ATORVASTATIN CALCIUM 10 MG/1
10 TABLET, FILM COATED ORAL DAILY
Qty: 90 TABLET | Refills: 1 | Status: SHIPPED | OUTPATIENT
Start: 2024-02-04

## 2024-02-28 DIAGNOSIS — F41.9 ANXIETY: Primary | ICD-10-CM

## 2024-02-29 RX ORDER — MIRTAZAPINE 7.5 MG/1
7.5 TABLET, FILM COATED ORAL NIGHTLY
Qty: 90 TABLET | Refills: 3 | Status: SHIPPED | OUTPATIENT
Start: 2024-02-29

## 2024-03-18 ENCOUNTER — OFFICE VISIT (OUTPATIENT)
Dept: NEUROLOGY | Facility: CLINIC | Age: 84
End: 2024-03-18
Payer: MEDICARE

## 2024-03-18 DIAGNOSIS — F03.94 DEMENTIA WITH ANXIETY, UNSPECIFIED DEMENTIA SEVERITY, UNSPECIFIED DEMENTIA TYPE (MULTI): Primary | ICD-10-CM

## 2024-03-18 PROCEDURE — 1036F TOBACCO NON-USER: CPT | Performed by: PSYCHIATRY & NEUROLOGY

## 2024-03-18 PROCEDURE — 1159F MED LIST DOCD IN RCRD: CPT | Performed by: PSYCHIATRY & NEUROLOGY

## 2024-03-18 PROCEDURE — 1160F RVW MEDS BY RX/DR IN RCRD: CPT | Performed by: PSYCHIATRY & NEUROLOGY

## 2024-03-18 PROCEDURE — 99214 OFFICE O/P EST MOD 30 MIN: CPT | Performed by: PSYCHIATRY & NEUROLOGY

## 2024-03-18 NOTE — PROGRESS NOTES
Chief complaint:    83 year old woman with dementia.  PCP Dr. JALYN Short.   Randolph.    HPI:    Ongoing dementia issues.  Randolph keeps a close eye on her.  No med side effects.  Randolph has help from [facility] for caregiver support.      Current medications include:  Donepezil 10 mg qday  Memantine 10 mg BID    I reviewed the relevant portions of the patient's chart since the last visit with me on 9/18/23.    Neurologic Exam     Mental Status   Oriented to person, place, and time.   Level of consciousness: alert  Short-term memory loss.  Forgets what we just talked about a minute ago.  Did better with longer-term recall.     Cranial Nerves   Cranial nerves II through XII intact.     Motor Exam   Muscle bulk: normal  Overall muscle tone: normal    Strength   Strength 5/5 except as noted.     Sensory Exam   Light touch normal.     Gait, Coordination, and Reflexes     Gait  Gait: normal    Coordination   Romberg: negative    Reflexes   Reflexes 2+ except as noted.   Right plantar: normal  Left plantar: normal     Assessment and Plan:  Dementia, probable Alzheimer's disease.  Discussed.  On maximum doses of donepezil and memantine.  Continue current treatment.   Call if problems or changes.  Inexorable progress of this disease reviewed with patient and .   The patient was advised I will be retiring in September of 2024.   Suggested patient establish care with another  neurologist.  There are providers that see patients in [place], [place], and other area locations.  Contact information to schedule was provided to the patient.   [id number]/TC>50%      Adryan Iqbal MD

## 2024-03-18 NOTE — PATIENT INSTRUCTIONS
Liv I am retiring in September. Please establish care with another  neurologist (call [phone] to schedule an appointment).  Local neurologists I recommend are Dr. Michael Medley, Dr. Wilmar Mckee, and Dr. Osmany Pinto.  Their offices are in [place] or [place] and you can schedule an appointment with them right now at your convenience.  Also, I can recommend Dr. Kyrie Valle and Dr. Jeanette Ahumada, who will be opening an office in [place] in the coming months - scheduling for them will be available soon by calling the same  scheduling number [phone].   And there are  neurologists in other locations too.  All of your medical records will be in the computer and available to any  physician you choose.  Thanks and best wishes to you  --Dr. Iqbal

## 2024-03-23 DIAGNOSIS — F03.94 DEMENTIA WITH ANXIETY, UNSPECIFIED DEMENTIA SEVERITY, UNSPECIFIED DEMENTIA TYPE (MULTI): ICD-10-CM

## 2024-03-25 RX ORDER — DONEPEZIL HYDROCHLORIDE 10 MG/1
10 TABLET, FILM COATED ORAL DAILY
Qty: 90 TABLET | Refills: 3 | Status: SHIPPED | OUTPATIENT
Start: 2024-03-25

## 2024-04-10 DIAGNOSIS — U07.1 COVID-19: Primary | ICD-10-CM

## 2024-04-23 ENCOUNTER — LAB (OUTPATIENT)
Dept: LAB | Facility: LAB | Age: 84
End: 2024-04-23
Payer: MEDICARE

## 2024-04-23 DIAGNOSIS — E53.8 VITAMIN B12 DEFICIENCY (NON ANEMIC): ICD-10-CM

## 2024-04-23 DIAGNOSIS — E78.2 MIXED HYPERLIPIDEMIA: ICD-10-CM

## 2024-04-23 DIAGNOSIS — G47.33 OBSTRUCTIVE SLEEP APNEA SYNDROME: ICD-10-CM

## 2024-04-23 DIAGNOSIS — I10 ESSENTIAL HYPERTENSION: ICD-10-CM

## 2024-04-23 DIAGNOSIS — E55.9 VITAMIN D DEFICIENCY: ICD-10-CM

## 2024-04-23 DIAGNOSIS — R73.03 PREDIABETES: ICD-10-CM

## 2024-04-23 DIAGNOSIS — Z01.89 ENCOUNTER FOR ROUTINE LABORATORY TESTING: ICD-10-CM

## 2024-04-23 DIAGNOSIS — E03.8 SUBCLINICAL HYPOTHYROIDISM: ICD-10-CM

## 2024-04-23 LAB
25(OH)D3 SERPL-MCNC: 40 NG/ML (ref 31–100)
ALBUMIN SERPL-MCNC: 4.6 G/DL (ref 3.5–5)
ALP BLD-CCNC: 86 U/L (ref 35–125)
ALT SERPL-CCNC: 21 U/L (ref 5–40)
ANION GAP SERPL CALC-SCNC: 16 MMOL/L
AST SERPL-CCNC: 24 U/L (ref 5–40)
BILIRUB DIRECT SERPL-MCNC: <0.2 MG/DL (ref 0–0.2)
BILIRUB SERPL-MCNC: 0.6 MG/DL (ref 0.1–1.2)
BUN SERPL-MCNC: 14 MG/DL (ref 8–25)
CALCIUM SERPL-MCNC: 9.6 MG/DL (ref 8.5–10.4)
CHLORIDE SERPL-SCNC: 97 MMOL/L (ref 97–107)
CHOLEST SERPL-MCNC: 168 MG/DL (ref 133–200)
CHOLEST/HDLC SERPL: 2.3 {RATIO}
CO2 SERPL-SCNC: 23 MMOL/L (ref 24–31)
CREAT SERPL-MCNC: 0.8 MG/DL (ref 0.4–1.6)
EGFRCR SERPLBLD CKD-EPI 2021: 74 ML/MIN/1.73M*2
ERYTHROCYTE [DISTWIDTH] IN BLOOD BY AUTOMATED COUNT: 12.3 % (ref 11.5–14.5)
EST. AVERAGE GLUCOSE BLD GHB EST-MCNC: 143 MG/DL
FOLATE SERPL-MCNC: 10.2 NG/ML (ref 4.2–19.9)
GLUCOSE SERPL-MCNC: 113 MG/DL (ref 65–99)
HBA1C MFR BLD: 6.6 %
HCT VFR BLD AUTO: 38.3 % (ref 36–46)
HDLC SERPL-MCNC: 74 MG/DL
HGB BLD-MCNC: 12.7 G/DL (ref 12–16)
LDLC SERPL CALC-MCNC: 67 MG/DL (ref 65–130)
MCH RBC QN AUTO: 29 PG (ref 26–34)
MCHC RBC AUTO-ENTMCNC: 33.2 G/DL (ref 32–36)
MCV RBC AUTO: 87 FL (ref 80–100)
NRBC BLD-RTO: 0 /100 WBCS (ref 0–0)
PLATELET # BLD AUTO: 340 X10*3/UL (ref 150–450)
POTASSIUM SERPL-SCNC: 4.6 MMOL/L (ref 3.4–5.1)
PROT SERPL-MCNC: 6.7 G/DL (ref 5.9–7.9)
RBC # BLD AUTO: 4.38 X10*6/UL (ref 4–5.2)
SODIUM SERPL-SCNC: 136 MMOL/L (ref 133–145)
TRIGL SERPL-MCNC: 133 MG/DL (ref 40–150)
TSH SERPL DL<=0.05 MIU/L-ACNC: 2.24 MIU/L (ref 0.27–4.2)
VIT B12 SERPL-MCNC: 511 PG/ML (ref 211–946)
WBC # BLD AUTO: 5.7 X10*3/UL (ref 4.4–11.3)

## 2024-04-23 PROCEDURE — 84443 ASSAY THYROID STIM HORMONE: CPT

## 2024-04-23 PROCEDURE — 82746 ASSAY OF FOLIC ACID SERUM: CPT

## 2024-04-23 PROCEDURE — 85027 COMPLETE CBC AUTOMATED: CPT

## 2024-04-23 PROCEDURE — 82607 VITAMIN B-12: CPT

## 2024-04-23 PROCEDURE — 36415 COLL VENOUS BLD VENIPUNCTURE: CPT

## 2024-04-23 PROCEDURE — 80053 COMPREHEN METABOLIC PANEL: CPT

## 2024-04-23 PROCEDURE — 82306 VITAMIN D 25 HYDROXY: CPT

## 2024-04-23 PROCEDURE — 80061 LIPID PANEL: CPT

## 2024-04-23 PROCEDURE — 82248 BILIRUBIN DIRECT: CPT

## 2024-04-23 PROCEDURE — 83036 HEMOGLOBIN GLYCOSYLATED A1C: CPT

## 2024-05-02 ENCOUNTER — TELEPHONE (OUTPATIENT)
Dept: NEUROLOGY | Facility: CLINIC | Age: 84
End: 2024-05-02
Payer: MEDICARE

## 2024-05-02 ENCOUNTER — LAB REQUISITION (OUTPATIENT)
Dept: LAB | Facility: HOSPITAL | Age: 84
End: 2024-05-02
Payer: MEDICARE

## 2024-05-02 PROCEDURE — 87086 URINE CULTURE/COLONY COUNT: CPT

## 2024-05-04 LAB — BACTERIA UR CULT: NO GROWTH

## 2024-05-06 ENCOUNTER — OFFICE VISIT (OUTPATIENT)
Dept: NEUROLOGY | Facility: CLINIC | Age: 84
End: 2024-05-06
Payer: MEDICARE

## 2024-05-06 DIAGNOSIS — F03.94 DEMENTIA WITH ANXIETY, UNSPECIFIED DEMENTIA SEVERITY, UNSPECIFIED DEMENTIA TYPE (MULTI): Primary | ICD-10-CM

## 2024-05-06 PROCEDURE — 1160F RVW MEDS BY RX/DR IN RCRD: CPT | Performed by: PSYCHIATRY & NEUROLOGY

## 2024-05-06 PROCEDURE — 99214 OFFICE O/P EST MOD 30 MIN: CPT | Performed by: PSYCHIATRY & NEUROLOGY

## 2024-05-06 PROCEDURE — 1159F MED LIST DOCD IN RCRD: CPT | Performed by: PSYCHIATRY & NEUROLOGY

## 2024-05-06 RX ORDER — QUETIAPINE FUMARATE 25 MG/1
25 TABLET, FILM COATED ORAL DAILY
Qty: 30 TABLET | Refills: 3 | Status: SHIPPED | OUTPATIENT
Start: 2024-05-06 | End: 2024-09-03

## 2024-05-06 NOTE — PATIENT INSTRUCTIONS
Liv I am retiring in September.  Please establish care with another  neurologist (call [phone] to schedule an appointment).  Local neurologists I recommend are Dr. Michael Medley and Dr. Osmany Pinto.  Their offices are in [place] or [place] and you can schedule an appointment with them right now at your convenience.  Also, I can recommend Dr. Kyrie Valle and Dr. Jeanette Ahumada, who will be opening an office in [place] in the coming months - scheduling for them will be available soon by calling the same  scheduling number [phone].   And there are  neurologists in other locations too.  All of your medical records will be in the computer and available to any  physician you choose.  Thanks and best wishes to you  --Dr. Iqbal

## 2024-05-06 NOTE — PROGRESS NOTES
Chief complaint:    84 year old woman with dementia.  PCP Dr. JALYN Short.  Lavell Dickson.    HPI:    Early appt today.  Been having episodes of agitation, irritability, gets very upset, fixates on thoughts and hard to reassure.  Goes off on tangents that aren't accurate, Randolph can't redirect her, gets agitated.  One time woke up in middle of night and insisted on calling sister to help her get dressed.  Randolph having more difficulty managing household and looking into memory care type facility for her.  Wanted to come in and discuss.    Current medications include:  Donepezil 10 mg qday  Memantine 10 mg BID    I reviewed the relevant portions of the patient's chart since the last visit with me on  3/18/24.    Neurologic Exam     Mental Status   Oriented to person, place, and time.   Level of consciousness: alert  Short-term memory loss.  Forgets what we just talked about.     Cranial Nerves   Cranial nerves II through XII intact.     Motor Exam   Muscle bulk: normal  Overall muscle tone: normal    Strength   Strength 5/5 except as noted.     Sensory Exam   Light touch normal.     Gait, Coordination, and Reflexes     Gait  Gait: normal    Coordination   Romberg: negative    Reflexes   Reflexes 2+ except as noted.   Right plantar: normal  Left plantar: normal     Assessment and Plan:  Progressive dementia consistent with Alzheimer's disease.  Now with episodes of agitation and Randolph having more and more difficulty managing as caregiver.  Discussed.  Agree with considering memory care type unit.  Reviewed medication treatment options.  Will try low-dose quetiapine 25 mg qday, take in afternoon.  The risks, benefits, alternatives, and indications were discussed with the patient, all questions answered, and the patient understands and agrees to proceed.  Give it few weeks and asked Randolph to call at that point with update.  The patient was advised I will be retiring in September of 2024.   Suggested patient establish care with  another  neurologist.  There are providers that see patients in [place], [place], and other area locations.  Contact information to schedule was provided to the patient, Randolph has the information and will schedule.   [id number]/TC>50%      Adryan Iqbal MD

## 2024-05-07 ENCOUNTER — OFFICE VISIT (OUTPATIENT)
Dept: PRIMARY CARE | Facility: CLINIC | Age: 84
End: 2024-05-07
Payer: MEDICARE

## 2024-05-07 VITALS
TEMPERATURE: 98 F | HEIGHT: 63 IN | SYSTOLIC BLOOD PRESSURE: 124 MMHG | WEIGHT: 128 LBS | HEART RATE: 68 BPM | DIASTOLIC BLOOD PRESSURE: 70 MMHG | OXYGEN SATURATION: 97 % | BODY MASS INDEX: 22.68 KG/M2

## 2024-05-07 DIAGNOSIS — E03.8 SUBCLINICAL HYPOTHYROIDISM: ICD-10-CM

## 2024-05-07 DIAGNOSIS — I50.32 CHRONIC DIASTOLIC HEART FAILURE (MULTI): ICD-10-CM

## 2024-05-07 DIAGNOSIS — E53.8 VITAMIN B12 DEFICIENCY: ICD-10-CM

## 2024-05-07 DIAGNOSIS — E55.9 VITAMIN D DEFICIENCY: ICD-10-CM

## 2024-05-07 DIAGNOSIS — F41.9 ANXIETY: ICD-10-CM

## 2024-05-07 DIAGNOSIS — E78.2 MIXED HYPERLIPIDEMIA: ICD-10-CM

## 2024-05-07 DIAGNOSIS — Z01.89 ENCOUNTER FOR ROUTINE LABORATORY TESTING: ICD-10-CM

## 2024-05-07 DIAGNOSIS — F02.84 ALZHEIMER'S DEMENTIA WITH ANXIETY, UNSPECIFIED DEMENTIA SEVERITY, UNSPECIFIED TIMING OF DEMENTIA ONSET (MULTI): Primary | ICD-10-CM

## 2024-05-07 DIAGNOSIS — M81.0 AGE-RELATED OSTEOPOROSIS WITHOUT CURRENT PATHOLOGICAL FRACTURE: ICD-10-CM

## 2024-05-07 DIAGNOSIS — M15.9 PRIMARY OSTEOARTHRITIS INVOLVING MULTIPLE JOINTS: ICD-10-CM

## 2024-05-07 DIAGNOSIS — E11.9 TYPE 2 DIABETES MELLITUS WITHOUT COMPLICATION, WITHOUT LONG-TERM CURRENT USE OF INSULIN (MULTI): ICD-10-CM

## 2024-05-07 DIAGNOSIS — G30.9 ALZHEIMER'S DEMENTIA WITH ANXIETY, UNSPECIFIED DEMENTIA SEVERITY, UNSPECIFIED TIMING OF DEMENTIA ONSET (MULTI): Primary | ICD-10-CM

## 2024-05-07 DIAGNOSIS — G47.33 OSA (OBSTRUCTIVE SLEEP APNEA): ICD-10-CM

## 2024-05-07 DIAGNOSIS — K86.89 PANCREATIC INSUFFICIENCY (HHS-HCC): ICD-10-CM

## 2024-05-07 DIAGNOSIS — N32.81 OAB (OVERACTIVE BLADDER): ICD-10-CM

## 2024-05-07 DIAGNOSIS — K21.9 GASTROESOPHAGEAL REFLUX DISEASE WITHOUT ESOPHAGITIS: ICD-10-CM

## 2024-05-07 DIAGNOSIS — I10 PRIMARY HYPERTENSION: ICD-10-CM

## 2024-05-07 DIAGNOSIS — E22.2 SIADH (SYNDROME OF INAPPROPRIATE ADH PRODUCTION) (MULTI): ICD-10-CM

## 2024-05-07 PROBLEM — R73.03 PREDIABETES: Status: RESOLVED | Noted: 2023-08-30 | Resolved: 2024-05-07

## 2024-05-07 PROBLEM — E78.5 HLD (HYPERLIPIDEMIA): Status: ACTIVE | Noted: 2023-08-30

## 2024-05-07 PROBLEM — M19.90 OA (OSTEOARTHRITIS): Status: ACTIVE | Noted: 2023-10-31

## 2024-05-07 PROCEDURE — 1126F AMNT PAIN NOTED NONE PRSNT: CPT | Performed by: STUDENT IN AN ORGANIZED HEALTH CARE EDUCATION/TRAINING PROGRAM

## 2024-05-07 PROCEDURE — 1159F MED LIST DOCD IN RCRD: CPT | Performed by: STUDENT IN AN ORGANIZED HEALTH CARE EDUCATION/TRAINING PROGRAM

## 2024-05-07 PROCEDURE — 1036F TOBACCO NON-USER: CPT | Performed by: STUDENT IN AN ORGANIZED HEALTH CARE EDUCATION/TRAINING PROGRAM

## 2024-05-07 PROCEDURE — 1160F RVW MEDS BY RX/DR IN RCRD: CPT | Performed by: STUDENT IN AN ORGANIZED HEALTH CARE EDUCATION/TRAINING PROGRAM

## 2024-05-07 PROCEDURE — G2211 COMPLEX E/M VISIT ADD ON: HCPCS | Performed by: STUDENT IN AN ORGANIZED HEALTH CARE EDUCATION/TRAINING PROGRAM

## 2024-05-07 PROCEDURE — 3074F SYST BP LT 130 MM HG: CPT | Performed by: STUDENT IN AN ORGANIZED HEALTH CARE EDUCATION/TRAINING PROGRAM

## 2024-05-07 PROCEDURE — 99214 OFFICE O/P EST MOD 30 MIN: CPT | Performed by: STUDENT IN AN ORGANIZED HEALTH CARE EDUCATION/TRAINING PROGRAM

## 2024-05-07 PROCEDURE — 3078F DIAST BP <80 MM HG: CPT | Performed by: STUDENT IN AN ORGANIZED HEALTH CARE EDUCATION/TRAINING PROGRAM

## 2024-05-07 RX ORDER — MEMANTINE HYDROCHLORIDE 10 MG/1
10 TABLET ORAL 2 TIMES DAILY
Start: 2024-05-07

## 2024-05-07 ASSESSMENT — PATIENT HEALTH QUESTIONNAIRE - PHQ9
1. LITTLE INTEREST OR PLEASURE IN DOING THINGS: NOT AT ALL
SUM OF ALL RESPONSES TO PHQ9 QUESTIONS 1 AND 2: 0
2. FEELING DOWN, DEPRESSED OR HOPELESS: NOT AT ALL

## 2024-05-07 ASSESSMENT — PAIN SCALES - GENERAL: PAINLEVEL: 0-NO PAIN

## 2024-05-07 NOTE — PATIENT INSTRUCTIONS
- Significant medication and problem list reconciliation done today.  - Patient had labwork done for this appointment. Discussed today.  - Patient's A1c up to 6.6%, consistent with new diagnosis of Type 2 Diabetes Mellitus (T2DM). Extremely mild, does not require treatment. Will order relevant labwork. Counseled dietary and lifestyle modification.  - Remainder of labwork looked great.  - Will order labwork for the patient's next appointment.  - Vitals look great. Do not need to make changes at this time.  - Encouraged continued diet and exercise modification.  - Encouraged continued use of CPAP.  - Patient has recently been seen by neurology for dementia. Notes continued memory decline with agitation. Started low-dose seroquel recently. Will continue to monitor. Patient's spouse notes some frustration with her continually declining memory and associated physical debility. Patient gets somewhat frustrated when  and I discuss her care and she doesn't feel involved; patient is mostly defensive about her memory and feels that her memory is better than it is.   - Discussed with him that, at some point, her needs might exceed the care that he is able to provide her at home and that, at some point, he might need to consider finding a memory unit for her.   - Continue current dementia medications. No further evaluation / workup / management needed at this time.

## 2024-05-07 NOTE — PROGRESS NOTES
[facility]: [facility]  PROGRESS NOTE      Liv Saucedo is a 84 y.o. female that is presenting today for Follow-up.    Assessment/Plan   Diagnoses and all orders for this visit:  Alzheimer's dementia with anxiety, unspecified dementia severity, unspecified timing of dementia onset (Multi)  -     memantine (Namenda) 10 mg tablet; Take 1 tablet (10 mg) by mouth 2 times a day.  -     CBC and Auto Differential; Future  Type 2 diabetes mellitus without complication, without long-term current use of insulin (Multi)  -     Hemoglobin A1C; Future  -     Albumin , Urine Random; Future  Age-related osteoporosis without current pathological fracture  -     Vitamin D 25-Hydroxy,Total (for eval of Vitamin D levels); Future  SIADH (syndrome of inappropriate ADH production) (Multi)  -     CBC and Auto Differential; Future  -     Basic Metabolic Panel; Future  Gastroesophageal reflux disease without esophagitis  Primary osteoarthritis involving multiple joints  Chronic diastolic heart failure (Multi)  Pancreatic insufficiency ([facility])  -     Hepatic Function Panel; Future  KENNETH (obstructive sleep apnea)  Subclinical hypothyroidism  OAB (overactive bladder)  Vitamin B12 deficiency  Mixed hyperlipidemia  -     Hepatic Function Panel; Future  Primary hypertension  -     Basic Metabolic Panel; Future  Vitamin D deficiency  -     Vitamin D 25-Hydroxy,Total (for eval of Vitamin D levels); Future  Anxiety  Encounter for routine laboratory testing  -     Follow Up In Primary Care  -     CBC and Auto Differential; Future  -     Basic Metabolic Panel; Future  -     Hepatic Function Panel; Future  -     Hemoglobin A1C; Future  -     Albumin , Urine Random; Future  -     Vitamin D 25-Hydroxy,Total (for eval of Vitamin D levels); Future  -     Follow Up In Primary Care; Future    - Significant medication and problem list reconciliation done today.  - Patient had labwork done for this appointment. Discussed today.  -  Patient's A1c up to 6.6%, consistent with new diagnosis of Type 2 Diabetes Mellitus (T2DM). Extremely mild, does not require treatment. Will order relevant labwork. Counseled dietary and lifestyle modification.  - Remainder of labwork looked great.  - Will order labwork for the patient's next appointment.  - Vitals look great. Do not need to make changes at this time.  - Encouraged continued diet and exercise modification.  - Encouraged continued use of CPAP.  - Patient has recently been seen by neurology for dementia. Notes continued memory decline with agitation. Started low-dose seroquel recently. Will continue to monitor. Patient's spouse notes some frustration with her continually declining memory and associated physical debility. Patient gets somewhat frustrated when  and I discuss her care and she doesn't feel involved; patient is mostly defensive about her memory and feels that her memory is better than it is.   - Discussed with him that, at some point, her needs might exceed the care that he is able to provide her at home and that, at some point, he might need to consider finding a memory unit for her.   - Continue current dementia medications. No further evaluation / workup / management needed at this time.     Subjective   - The patient otherwise feels well and denies any acute symptoms or concerns at this time.  - The patient denies any changes or progression of their chronic medical problems.  - The patient denies any problems or concerns with their medications.      Review of Systems   Unable to perform ROS: Dementia      Objective   Vitals:    05/07/24 1100   BP: 124/70   Pulse: 68   Temp: 36.7 °C (98 °F)   SpO2: 97%      Body mass index is 22.68 kg/m².  Physical Exam  Vitals and nursing note reviewed.   Constitutional:       General: She is not in acute distress.  Neck:      Vascular: No carotid bruit.   Cardiovascular:      Rate and Rhythm: Normal rate and regular rhythm.      Heart sounds:  "Normal heart sounds.   Pulmonary:      Effort: Pulmonary effort is normal.      Breath sounds: Normal breath sounds.   Musculoskeletal:         General: No swelling.   Neurological:      Mental Status: She is alert. Mental status is at baseline.      Comments: Patient at her baseline. Somewhat defensive about how bad her memory has become.    Psychiatric:         Mood and Affect: Mood normal.         Behavior: Behavior is cooperative.       Diagnostic Results   Lab Results   Component Value Date    GLUCOSE 113 (H) 04/23/2024    CALCIUM 9.6 04/23/2024     04/23/2024    K 4.6 04/23/2024    CO2 23 (L) 04/23/2024    CL 97 04/23/2024    BUN 14 04/23/2024    CREATININE 0.80 04/23/2024     Lab Results   Component Value Date    ALT 21 04/23/2024    AST 24 04/23/2024    ALKPHOS 86 04/23/2024    BILITOT 0.6 04/23/2024     Lab Results   Component Value Date    WBC 5.7 04/23/2024    HGB 12.7 04/23/2024    HCT 38.3 04/23/2024    MCV 87 04/23/2024     04/23/2024     Lab Results   Component Value Date    CHOL 168 04/23/2024    CHOL 174 05/08/2023    CHOL 179 03/31/2022     Lab Results   Component Value Date    HDL 74.0 04/23/2024    HDL 74 05/08/2023    HDL 73 03/31/2022     Lab Results   Component Value Date    LDLCALC 67 04/23/2024    LDLCALC 76 05/08/2023    LDLCALC 86 03/31/2022     Lab Results   Component Value Date    TRIG 133 04/23/2024    TRIG 119 05/08/2023    TRIG 98 03/31/2022     No components found for: \"CHOLHDL\"  Lab Results   Component Value Date    HGBA1C 6.6 (H) 04/23/2024     Other labs not included in the list above were reviewed either before or during this encounter.    History    No past medical history on file.  Past Surgical History:   Procedure Laterality Date    CT ABDOMEN ANGIOGRAM W AND/OR WO IV CONTRAST  6/19/2013    CT ABDOMEN ANGIOGRAM W AND/OR WO IV CONTRAST [facility]    TOTAL SHOULDER ARTHROPLASTY  07/15/2016    Shoulder Arthroplasty Total Shoulder Replacement     Family " "History   Problem Relation Name Age of Onset    Breast cancer Mother          cause of death    Multiple sclerosis Mother      Other (bladder cancer) Father          cause of death    Liver disease Sister          End stage    Obesity Sister      Diabetes Sister      Hypothyroidism Sister      Hashimoto's thyroiditis Sister      Osteoporosis Sister      Dystonia Sister          Cervical    Other (Vascular dementia) Other          Father's second cousin    Other (lewy body dementia) Other          Father's side    Breast cancer Other          sister     Social History     Socioeconomic History    Marital status:      Spouse name: Not on file    Number of children: Not on file    Years of education: Not on file    Highest education level: Not on file   Occupational History    Not on file   Tobacco Use    Smoking status: Never    Smokeless tobacco: Never   Vaping Use    Vaping status: Never Used   Substance and Sexual Activity    Alcohol use: Yes     Comment: occasionally    Drug use: Never    Sexual activity: Not on file   Other Topics Concern    Not on file   Social History Narrative    Not on file     Social Determinants of Health     Financial Resource Strain: Not on file   Food Insecurity: Not on file   Transportation Needs: Not on file   Physical Activity: Not on file   Stress: Not on file   Social Connections: Not on file   Intimate Partner Violence: Not on file   Housing Stability: Not on file     Allergies   Allergen Reactions    Lorazepam Hallucinations    Memantine Unknown    Naproxen Unknown    Nsaids (Non-Steroidal Anti-Inflammatory Drug) Other     \"hyponatremia    Sulfa (Sulfonamide Antibiotics) Other     mental status change    Tetracyclines Other and Unknown     Nausea      Current Outpatient Medications on File Prior to Visit   Medication Sig Dispense Refill    ALPRAZolam (Xanax) 0.5 mg tablet Take 1 tablet (0.5 mg) by mouth as needed at bedtime for anxiety.      amLODIPine (Norvasc) 5 mg tablet " Take 1 tablet (5 mg) by mouth once daily. 90 tablet 1    atorvastatin (Lipitor) 10 mg tablet Take 1 tablet (10 mg) by mouth once daily. 90 tablet 1    cholecalciferol (Vitamin D-3) 25 MCG (1000 UT) capsule Take 1 capsule (25 mcg) by mouth once daily.      diclofenac sodium (Voltaren) 1 % gel gel as directed Externally Twice a day      donepezil (Aricept) 10 mg tablet TAKE 1 TABLET BY MOUTH ONCE  DAILY 90 tablet 3    fluticasone (Flonase) 50 mcg/actuation nasal spray 2 sprays Nasally Once a day      lisinopril 5 mg tablet Take 1 tablet (5 mg) by mouth once daily.      lubricating eye drops (polyvinyl alcohol-povidon,PF,) ophthalmic solution 2 drops each eye Ophthalmic bid      metFORMIN  mg 24 hr tablet Take 1 tablet (500 mg) by mouth once daily in the evening. Take with meals. 90 tablet 1    mirtazapine (Remeron) 7.5 mg tablet TAKE 1 TABLET BY MOUTH AT  BEDTIME 90 tablet 3    QUEtiapine (SeroqueL) 25 mg tablet Take 1 tablet (25 mg) by mouth once daily. 30 tablet 3    [DISCONTINUED] memantine (Namenda) 10 mg tablet Take 1 tablet (10 mg) by mouth once daily. (Patient taking differently: Take 1 tablet (10 mg) by mouth 2 times a day.)      [DISCONTINUED] alpha-D-galactosidase (BEANO ORAL) 1 tablets Orally three times a day with each meal       No current facility-administered medications on file prior to visit.     Immunization History   Administered Date(s) Administered    Flu vaccine, quadrivalent, high-dose, preservative free, age 65y+ (FLUZONE) 09/16/2021, 09/30/2022, 09/13/2023    Hep A, Unspecified 12/09/2005, 06/09/2006    Hepatitis A vaccine, pediatric/adolescent (HAVRIX, VAQTA) 01/25/2005    Influenza, High Dose Seasonal, Preservative Free 11/07/2016, 10/25/2017, 09/27/2018, 09/24/2019    Influenza, seasonal, injectable 10/22/2010, 10/19/2012, 10/28/2013, 10/30/2014    Influenza, trivalent, adjuvanted 09/10/2020    Moderna COVID-19 vaccine, Fall 2023, 12 yeasrs and older (50mcg/0.5mL) 10/11/2023     Moderna COVID-19 vaccine, bivalent, blue cap/gray label *Check age/dose* 09/14/2022    Moderna SARS-CoV-2 Vaccination 01/26/2021, 02/23/2021, 10/27/2021, 03/31/2022    Pneumococcal conjugate vaccine, 13-valent (PREVNAR 13) 12/02/2016    Pneumococcal polysaccharide vaccine, 23-valent, age 2 years and older (PNEUMOVAX 23) 11/10/2011    Tdap vaccine, age 7 year and older (BOOSTRIX, ADACEL) 07/30/2018    Zoster vaccine, recombinant, adult (SHINGRIX) 12/07/2018, 08/16/2019, 08/23/2019     Patient's medical history was reviewed and updated either before or during this encounter.       Reese Short MD

## 2024-05-25 DIAGNOSIS — I10 ESSENTIAL HYPERTENSION: ICD-10-CM

## 2024-05-28 RX ORDER — LISINOPRIL 5 MG/1
5 TABLET ORAL DAILY
Qty: 90 TABLET | Refills: 3 | Status: SHIPPED | OUTPATIENT
Start: 2024-05-28

## 2024-05-28 RX ORDER — AMLODIPINE BESYLATE 5 MG/1
5 TABLET ORAL DAILY
Qty: 90 TABLET | Refills: 3 | Status: SHIPPED | OUTPATIENT
Start: 2024-05-28

## 2024-05-30 ENCOUNTER — TELEPHONE (OUTPATIENT)
Dept: PRIMARY CARE | Facility: CLINIC | Age: 84
End: 2024-05-30
Payer: MEDICARE

## 2024-05-30 DIAGNOSIS — M25.519 SHOULDER PAIN, UNSPECIFIED CHRONICITY, UNSPECIFIED LATERALITY: ICD-10-CM

## 2024-06-05 ENCOUNTER — APPOINTMENT (OUTPATIENT)
Dept: ORTHOPEDIC SURGERY | Facility: CLINIC | Age: 84
End: 2024-06-05
Payer: MEDICARE

## 2024-06-07 ENCOUNTER — OFFICE VISIT (OUTPATIENT)
Dept: ORTHOPEDIC SURGERY | Facility: CLINIC | Age: 84
End: 2024-06-07
Payer: MEDICARE

## 2024-06-07 ENCOUNTER — HOSPITAL ENCOUNTER (OUTPATIENT)
Dept: RADIOLOGY | Facility: CLINIC | Age: 84
Discharge: HOME | End: 2024-06-07
Payer: MEDICARE

## 2024-06-07 VITALS — WEIGHT: 128.09 LBS | BODY MASS INDEX: 22.7 KG/M2

## 2024-06-07 DIAGNOSIS — M25.519 SHOULDER PAIN, UNSPECIFIED CHRONICITY, UNSPECIFIED LATERALITY: Primary | ICD-10-CM

## 2024-06-07 DIAGNOSIS — R52 PAIN: ICD-10-CM

## 2024-06-07 DIAGNOSIS — Z96.611 S/P SHOULDER REPLACEMENT, RIGHT: ICD-10-CM

## 2024-06-07 PROCEDURE — 73030 X-RAY EXAM OF SHOULDER: CPT | Mod: RIGHT SIDE | Performed by: ORTHOPAEDIC SURGERY

## 2024-06-07 PROCEDURE — 1036F TOBACCO NON-USER: CPT | Performed by: PHYSICIAN ASSISTANT

## 2024-06-07 PROCEDURE — 99213 OFFICE O/P EST LOW 20 MIN: CPT | Performed by: PHYSICIAN ASSISTANT

## 2024-06-07 PROCEDURE — 73030 X-RAY EXAM OF SHOULDER: CPT | Mod: RT

## 2024-06-07 PROCEDURE — 1160F RVW MEDS BY RX/DR IN RCRD: CPT | Performed by: PHYSICIAN ASSISTANT

## 2024-06-07 PROCEDURE — 1159F MED LIST DOCD IN RCRD: CPT | Performed by: PHYSICIAN ASSISTANT

## 2024-06-07 PROCEDURE — 1125F AMNT PAIN NOTED PAIN PRSNT: CPT | Performed by: PHYSICIAN ASSISTANT

## 2024-06-07 PROCEDURE — 99203 OFFICE O/P NEW LOW 30 MIN: CPT | Performed by: PHYSICIAN ASSISTANT

## 2024-06-07 ASSESSMENT — ENCOUNTER SYMPTOMS
LOSS OF SENSATION IN FEET: 0
OCCASIONAL FEELINGS OF UNSTEADINESS: 0
DEPRESSION: 0

## 2024-06-07 ASSESSMENT — PAIN - FUNCTIONAL ASSESSMENT: PAIN_FUNCTIONAL_ASSESSMENT: 0-10

## 2024-06-07 ASSESSMENT — PATIENT HEALTH QUESTIONNAIRE - PHQ9
1. LITTLE INTEREST OR PLEASURE IN DOING THINGS: NOT AT ALL
2. FEELING DOWN, DEPRESSED OR HOPELESS: NOT AT ALL
SUM OF ALL RESPONSES TO PHQ9 QUESTIONS 1 AND 2: 0

## 2024-06-07 ASSESSMENT — LIFESTYLE VARIABLES: TOTAL SCORE: 0

## 2024-06-07 ASSESSMENT — PAIN SCALES - GENERAL: PAINLEVEL_OUTOF10: 6

## 2024-06-07 ASSESSMENT — PAIN DESCRIPTION - DESCRIPTORS: DESCRIPTORS: ACHING

## 2024-06-07 NOTE — PATIENT INSTRUCTIONS
Thank you for coming to see us today!     We are going to give you a referral for physical therapy  at [facility]    Please follow up with us as needed

## 2024-06-07 NOTE — PROGRESS NOTES
Subjective      Chief Complaint   Patient presents with    Right Shoulder - Pain        Past Surgical History:   Procedure Laterality Date    CT ABDOMEN ANGIOGRAM W AND/OR WO IV CONTRAST  6/19/2013    CT ABDOMEN ANGIOGRAM W AND/OR WO IV CONTRAST [facility]    TOTAL SHOULDER ARTHROPLASTY  07/15/2016    Shoulder Arthroplasty Total Shoulder Replacement        HPI  This 84 year old patient presents today with right shoulder pain 4.  She has a history of right shoulder replacement done by Dr. Acuna at the clinic in 2014.  She denies any recent trauma or injury.  She is currently a resident at [facility] secondary to her memory loss problems. the patient states that the right shoulder pain has been present for 5 months. The patient states that the right shoulder pain is worse with and aggravated by reaching and lifting. The patient states that this shoulder pain is disabling and presents today to discuss further options. The patient states that they have tried tylenol and ibuprofen with no relief.    CARDIOLOGY:   Negative for chest pain, shortness of breath.   RESPIRATORY:   Negative for chest pain, shortness of breath.   MUSCULOSKELETAL:   See HPI for details.   NEUROLOGY:   Negative for tingling, numbness, weakness.    Objective      There were no vitals taken for this visit.     SHOULDER EXAM  Constitutional: Appears stated age. No apparent distress  Labored Breathing: No  Psychiatric: Normal mood and effect.   Neurological: alert and oriented x3  Skin: intact  HEENT: No bruising, otorrhea, rhinorrhea.  MUSCULOSKELETAL: Neck: No tenderness. No pain or limitation with range of motion. Back: No tenderness. Straight leg test negative bilaterally. right shoulder: Incision clean dry and well-healed there is no tenderness anteriorly and laterally. Active abduction and active flexion are 0-120 degrees with no pain or guarding. There is no pain or limitation of active and passive internal and external rotation.  Comparments are soft. Neurovascular is intact.     AP and lateral x-rays of the right shoulder done in the office today show right shoulder replacement.    Liv was seen today for pain.  Diagnoses and all orders for this visit:  Shoulder pain, unspecified chronicity, unspecified laterality (Primary)  -     Referral to Orthopaedic Surgery  -     Referral to Physical Therapy; Future  S/P shoulder replacement, right  -     Referral to Physical Therapy; Future  Options are discussed with the patient in detail. The patient is given a prescription for physical therapy to evaluate and treat with gentle strengthening and ROM exercises.. The patient is instructed regarding activity modification and risk for further injury with falling or trauma, ice, provider directed at home gentle strengthening and ROM exercises, and the appropriate use of Tylenol as needed for pain with its potential adverse reactions and side effects. The patient understands.  Return as needed please note that this report has been produced using speech recognition software.  It may contain errors related to grammar, punctuation or spelling.  Electronically signed, but not reviewed.    Carole Kelly PA-C

## 2024-06-13 ENCOUNTER — TELEPHONE (OUTPATIENT)
Dept: ORTHOPEDIC SURGERY | Facility: CLINIC | Age: 84
End: 2024-06-13
Payer: MEDICARE

## 2024-06-13 DIAGNOSIS — M25.519 SHOULDER PAIN, UNSPECIFIED CHRONICITY, UNSPECIFIED LATERALITY: Primary | ICD-10-CM

## 2024-06-13 DIAGNOSIS — Z96.611 S/P SHOULDER REPLACEMENT, RIGHT: ICD-10-CM

## 2024-06-19 ENCOUNTER — TELEPHONE (OUTPATIENT)
Dept: PRIMARY CARE | Facility: CLINIC | Age: 84
End: 2024-06-19
Payer: MEDICARE

## 2024-06-19 DIAGNOSIS — M25.519 SHOULDER PAIN, UNSPECIFIED CHRONICITY, UNSPECIFIED LATERALITY: Primary | ICD-10-CM

## 2024-08-29 ENCOUNTER — PATIENT MESSAGE (OUTPATIENT)
Dept: PRIMARY CARE | Facility: CLINIC | Age: 84
End: 2024-08-29
Payer: MEDICARE

## 2024-08-29 DIAGNOSIS — F03.94 DEMENTIA WITH ANXIETY, UNSPECIFIED DEMENTIA SEVERITY, UNSPECIFIED DEMENTIA TYPE (MULTI): Primary | ICD-10-CM

## 2024-09-06 RX ORDER — ESCITALOPRAM OXALATE 5 MG/1
5 TABLET ORAL DAILY
Qty: 30 TABLET | Refills: 2 | Status: SHIPPED | OUTPATIENT
Start: 2024-09-06 | End: 2024-12-05

## 2024-10-01 ENCOUNTER — TELEPHONE (OUTPATIENT)
Dept: PRIMARY CARE | Facility: CLINIC | Age: 84
End: 2024-10-01

## 2024-10-01 ENCOUNTER — APPOINTMENT (OUTPATIENT)
Dept: NEUROLOGY | Facility: CLINIC | Age: 84
End: 2024-10-01
Payer: MEDICARE

## 2024-10-01 VITALS
HEART RATE: 69 BPM | WEIGHT: 133 LBS | DIASTOLIC BLOOD PRESSURE: 76 MMHG | HEIGHT: 63 IN | SYSTOLIC BLOOD PRESSURE: 138 MMHG | BODY MASS INDEX: 23.57 KG/M2

## 2024-10-01 DIAGNOSIS — F03.94 DEMENTIA WITH ANXIETY, UNSPECIFIED DEMENTIA SEVERITY, UNSPECIFIED DEMENTIA TYPE (MULTI): ICD-10-CM

## 2024-10-01 PROCEDURE — 3075F SYST BP GE 130 - 139MM HG: CPT | Performed by: PSYCHIATRY & NEUROLOGY

## 2024-10-01 PROCEDURE — 1159F MED LIST DOCD IN RCRD: CPT | Performed by: PSYCHIATRY & NEUROLOGY

## 2024-10-01 PROCEDURE — 3078F DIAST BP <80 MM HG: CPT | Performed by: PSYCHIATRY & NEUROLOGY

## 2024-10-01 PROCEDURE — 1157F ADVNC CARE PLAN IN RCRD: CPT | Performed by: PSYCHIATRY & NEUROLOGY

## 2024-10-01 PROCEDURE — 99214 OFFICE O/P EST MOD 30 MIN: CPT | Performed by: PSYCHIATRY & NEUROLOGY

## 2024-10-01 RX ORDER — ADHESIVE BANDAGE
30 BANDAGE TOPICAL DAILY PRN
COMMUNITY

## 2024-10-01 RX ORDER — LOPERAMIDE HYDROCHLORIDE 2 MG/1
4 CAPSULE ORAL DAILY PRN
COMMUNITY
Start: 2024-09-08

## 2024-10-01 RX ORDER — ESCITALOPRAM OXALATE 5 MG/1
5 TABLET ORAL DAILY
Start: 2024-10-01 | End: 2024-10-01

## 2024-10-01 RX ORDER — ESCITALOPRAM OXALATE 5 MG/1
5 TABLET ORAL DAILY
Qty: 30 TABLET | Refills: 2 | Status: SHIPPED | OUTPATIENT
Start: 2024-10-01 | End: 2024-12-30

## 2024-10-01 ASSESSMENT — ENCOUNTER SYMPTOMS
HYPERACTIVE: 1
DYSPHORIC MOOD: 1
AGITATION: 1
NERVOUS/ANXIOUS: 1
DECREASED CONCENTRATION: 1
CONFUSION: 1

## 2024-10-01 NOTE — PATIENT INSTRUCTIONS
Thank you for your visit today. As we discussed today, you have dementia which is a progressive disease. The dementia is likely a vascular dementia but cannot rule out an alzheimer dementia at this time. To rule out Alzheimer dementia and monoclonal antibody treatment there is a workup. There is new treatments for alzheimer dementia called Celestino and Kisunla for you to do some research. If you have any questions please do not hesitate to reach out to the office.

## 2024-10-01 NOTE — PROGRESS NOTES
"Chief Complaint Liv Saucedo is a 84 y.o. female who presents for dementia.    HPI:  Pt is presenting to the office today to establish for dementia.  Patient was previously following with Dr. Iqbal.  Patient is accompanied by her .  Both patient and her  gave history at this visit.  Patient states that she currently lives in [facility] which is a assisted living facility and is doing well there.  Patient's  stated that the patient was placed in assisted living facility after he was unable to take care of her at home anymore.  Patient states that her dementia is stable, however her  endorses that he continues to see a decline in her memory and cognition.   states that the patient often has to be redirected and reminded of things such as mealtimes and activities.  Patient views her experience very differently and states that she is better than a lot of other residents that live at the facility and \"has become like staff due to her ability to take care of herself.\"  ROS reviewed below.      ROS:  Review of Systems   Psychiatric/Behavioral:  Positive for agitation, behavioral problems, confusion, decreased concentration and dysphoric mood. The patient is nervous/anxious and is hyperactive.        Meds:    Current Outpatient Medications:     amLODIPine (Norvasc) 5 mg tablet, TAKE 1 TABLET BY MOUTH ONCE  DAILY, Disp: 90 tablet, Rfl: 3    atorvastatin (Lipitor) 10 mg tablet, Take 1 tablet (10 mg) by mouth once daily., Disp: 90 tablet, Rfl: 1    calcium carbonate/vitamin D3 (CALCIUM 600 WITH VITAMIN D3 ORAL), Take by mouth., Disp: , Rfl:     cholecalciferol (Vitamin D-3) 25 MCG (1000 UT) capsule, Take 1 capsule (25 mcg) by mouth once daily., Disp: , Rfl:     donepezil (Aricept) 10 mg tablet, TAKE 1 TABLET BY MOUTH ONCE  DAILY, Disp: 90 tablet, Rfl: 3    lisinopril 5 mg tablet, TAKE 1 TABLET BY MOUTH ONCE  DAILY (Patient taking differently: Take 2 tablets (10 mg) by mouth once " "daily.), Disp: 90 tablet, Rfl: 3    loperamide (Imodium) 2 mg capsule, Take 2 capsules (4 mg) by mouth once daily as needed for diarrhea., Disp: , Rfl:     magnesium hydroxide (Milk of Magnesia) 400 mg/5 mL suspension, Take 30 mL by mouth once daily as needed., Disp: , Rfl:     memantine (Namenda) 10 mg tablet, Take 1 tablet (10 mg) by mouth 2 times a day., Disp: , Rfl:     metFORMIN  mg 24 hr tablet, Take 1 tablet (500 mg) by mouth once daily in the evening. Take with meals., Disp: 90 tablet, Rfl: 1    mirtazapine (Remeron) 7.5 mg tablet, TAKE 1 TABLET BY MOUTH AT  BEDTIME, Disp: 90 tablet, Rfl: 3    escitalopram (Lexapro) 5 mg tablet, Take 1 tablet (5 mg) by mouth once daily., Disp: 30 tablet, Rfl: 2    QUEtiapine (SeroqueL) 25 mg tablet, Take 1 tablet (25 mg) by mouth once daily., Disp: 30 tablet, Rfl: 3    Allergies:  Allergies   Allergen Reactions    Lorazepam Hallucinations    Naproxen Unknown    Nsaids (Non-Steroidal Anti-Inflammatory Drug) Other     \"hyponatremia    Sulfa (Sulfonamide Antibiotics) Other     mental status change    Tetracyclines Other and Unknown     Nausea        PE:  Visit Vitals  /76 (BP Location: Left arm, Patient Position: Sitting, BP Cuff Size: Adult)   Pulse 69   Ht 1.6 m (5' 3\")   Wt 60.3 kg (133 lb)   BMI 23.56 kg/m²   Smoking Status Never   BSA 1.64 m²     Physical Exam  Psychiatric:         Attention and Perception: Attention normal.         Mood and Affect: Affect is labile.         Speech: Speech is rapid and pressured.         Behavior: Behavior is agitated. Behavior is cooperative.         Thought Content: Thought content normal.         Cognition and Memory: Memory is impaired. She exhibits impaired recent memory and impaired remote memory.         Assessment/Plan  Liv Saucedo is a 84 y.o. female who presents for dementia.  Discussed dementia and its progression and its treatment with the patient and her .  The type of dementia has not been fully " diagnosed prior but it was inferred that it was a vascular dementia.  It was offered to the patient and her  if she wanted to be tested for Alzheimer dementia and possible monoclonal antibody treatments if appropriate.   wanted to research more on the topic.  Will await 's decision to decide whether we will continue the workup.   and patient agreed with this treatment and plan.  Will leave follow-up to the 's discretion.    Liv was seen today for dementia.  Diagnoses and all orders for this visit:  Dementia with anxiety, unspecified dementia severity, unspecified dementia type (Multi)  -     Discontinue: escitalopram (Lexapro) 5 mg tablet; Take 1 tablet (5 mg) by mouth once daily.  -     Discontinue: escitalopram (Lexapro) 5 mg tablet; Take 1 tablet (5 mg) by mouth once daily.  -     escitalopram (Lexapro) 5 mg tablet; Take 1 tablet (5 mg) by mouth once daily.       Follow up as needed      Patient was staffed with Dr. Jasmyne Arguelles DO, PGY-3  [facility]

## 2024-10-09 ENCOUNTER — NURSING HOME VISIT (OUTPATIENT)
Dept: POST ACUTE CARE | Facility: EXTERNAL LOCATION | Age: 84
End: 2024-10-09
Payer: MEDICARE

## 2024-10-09 DIAGNOSIS — Z78.9: ICD-10-CM

## 2024-10-09 DIAGNOSIS — E78.2 MIXED HYPERLIPIDEMIA: ICD-10-CM

## 2024-10-09 DIAGNOSIS — F01.50 VASCULAR DEMENTIA WITHOUT BEHAVIORAL DISTURBANCE, PSYCHOTIC DISTURBANCE, MOOD DISTURBANCE, OR ANXIETY, UNSPECIFIED DEMENTIA SEVERITY (MULTI): ICD-10-CM

## 2024-10-09 DIAGNOSIS — Z29.89 NEED FOR PROPHYLAXIS AGAINST URINARY TRACT INFECTION: ICD-10-CM

## 2024-10-09 DIAGNOSIS — E11.9 DIABETES MELLITUS WITHOUT COMPLICATION (MULTI): ICD-10-CM

## 2024-10-09 DIAGNOSIS — F41.9 ANXIETY: ICD-10-CM

## 2024-10-09 DIAGNOSIS — E55.9 VITAMIN D DEFICIENCY: ICD-10-CM

## 2024-10-09 DIAGNOSIS — I10 PRIMARY HYPERTENSION: Primary | ICD-10-CM

## 2024-10-09 DIAGNOSIS — G47.33 OSA (OBSTRUCTIVE SLEEP APNEA): ICD-10-CM

## 2024-10-09 PROCEDURE — G0439 PPPS, SUBSEQ VISIT: HCPCS

## 2024-10-09 ASSESSMENT — PAIN SCALES - GENERAL: PAINLEVEL: 0-NO PAIN

## 2024-10-09 ASSESSMENT — ACTIVITIES OF DAILY LIVING (ADL)
PATIENT'S MEMORY ADEQUATE TO SAFELY COMPLETE DAILY ACTIVITIES?: YES
GROOMING: INDEPENDENT
BATHING: INDEPENDENT
HEARING - RIGHT EAR: FUNCTIONAL
ADEQUATE_TO_COMPLETE_ADL: YES
JUDGMENT_ADEQUATE_SAFELY_COMPLETE_DAILY_ACTIVITIES: YES
FEEDING YOURSELF: INDEPENDENT
HEARING - LEFT EAR: FUNCTIONAL
TOILETING: INDEPENDENT
WALKS IN HOME: INDEPENDENT
DRESSING YOURSELF: INDEPENDENT

## 2024-10-09 NOTE — LETTER
Patient: Liv Saucedo  : 1940    Encounter Date: 10/09/2024    Subjective  Patient ID: Liv Saucedo is a 84 y.o. female who is assisted living/ home patient being seen at [facility], and evaluated to Establish care/Medicare Wellness Exam.     HPI     Pt visited in apartment of assisted living. Pt oriented x1, comfortable and denies pain. Pt with hx of dementia, able to answer simple yes and no questions. Difficulty finding words at time when explaining self. Pt is forgetful at times as reported by nursing. Pt denies changes in vision, wears glasses. Denies changes I hearing, appears to hear normal tone of voice. States appetite is good, denies difficulty with chewing and swallowing. Pt denies headache, dizziness, congestion, runny nose and sore throat.     Pt denies chest pain, and sob at rest/exertion. Pt denies voiding symptoms, and constipation. Pt with hx of UTI's. Pt denies sleep disturbances. Pt follows psych for medication and symptom management of dementia. Pt ambulates independently. Denies any recent falls. Pt able to perform ADL's independently.    Current Outpatient Medications on File Prior to Visit   Medication Sig Dispense Refill   • amLODIPine (Norvasc) 5 mg tablet TAKE 1 TABLET BY MOUTH ONCE  DAILY 90 tablet 3   • atorvastatin (Lipitor) 10 mg tablet Take 1 tablet (10 mg) by mouth once daily. 90 tablet 1   • calcium carbonate/vitamin D3 (CALCIUM 600 WITH VITAMIN D3 ORAL) Take by mouth.     • cholecalciferol (Vitamin D-3) 25 MCG (1000 UT) capsule Take 1 capsule (25 mcg) by mouth once daily.     • donepezil (Aricept) 10 mg tablet TAKE 1 TABLET BY MOUTH ONCE  DAILY 90 tablet 3   • lisinopril 5 mg tablet TAKE 1 TABLET BY MOUTH ONCE  DAILY (Patient taking differently: Take 2 tablets (10 mg) by mouth once daily.) 90 tablet 3   • loperamide (Imodium) 2 mg capsule Take 2 capsules (4 mg) by mouth once daily as needed for diarrhea.     • magnesium hydroxide (Milk of Magnesia) 400  "mg/5 mL suspension Take 30 mL by mouth once daily as needed.     • memantine (Namenda) 10 mg tablet Take 1 tablet (10 mg) by mouth 2 times a day.     • metFORMIN  mg 24 hr tablet Take 1 tablet (500 mg) by mouth once daily in the evening. Take with meals. 90 tablet 1   • mirtazapine (Remeron) 7.5 mg tablet TAKE 1 TABLET BY MOUTH AT  BEDTIME 90 tablet 3   • QUEtiapine (SeroqueL) 25 mg tablet Take 1 tablet (25 mg) by mouth once daily. 30 tablet 3   • escitalopram (Lexapro) 5 mg tablet Take 1 tablet (5 mg) by mouth once daily. (Patient not taking: Reported on 10/12/2024) 30 tablet 2     No current facility-administered medications on file prior to visit.      Review of Systems   Constitutional:  Negative for activity change, appetite change, chills, fatigue and fever.   HENT:  Negative for congestion, rhinorrhea and sore throat.    Respiratory:  Negative for cough and shortness of breath.    Cardiovascular:  Negative for chest pain and leg swelling.   Gastrointestinal:  Negative for constipation, diarrhea, nausea and vomiting.   Genitourinary:  Negative for frequency.   Musculoskeletal:  Negative for gait problem.   Neurological:  Negative for weakness and headaches.   Psychiatric/Behavioral:  The patient is not nervous/anxious.        Objective  /60 (BP Location: Right arm, Patient Position: Sitting, BP Cuff Size: Adult)   Pulse 61   Resp 16   Ht 1.6 m (5' 3\")   Wt 63 kg (139 lb)   SpO2 97%   BMI 24.62 kg/m²     Lab Results   Component Value Date    GLUCOSE 164 (H) 10/11/2024    CALCIUM 9.2 10/11/2024     (L) 10/11/2024    K 4.3 10/11/2024    CO2 25 10/11/2024    CL 99 10/11/2024    BUN 17 10/11/2024    CREATININE 0.89 10/11/2024      Lab Results   Component Value Date    WBC 8.1 10/11/2024    HGB 12.4 10/11/2024    HCT 36.6 10/11/2024    MCV 87 10/11/2024     10/11/2024      Lab Results   Component Value Date    [id number] 511 04/23/2024      Physical Exam  Constitutional:       General: " She is awake. She is not in acute distress.     Appearance: Normal appearance. She is not ill-appearing.   HENT:      Head: Normocephalic.      Nose: Nose normal. No congestion or rhinorrhea.      Mouth/Throat:      Mouth: Mucous membranes are moist.   Eyes:      Conjunctiva/sclera: Conjunctivae normal.      Pupils: Pupils are equal, round, and reactive to light.   Cardiovascular:      Rate and Rhythm: Normal rate and regular rhythm.      Pulses: Normal pulses.      Heart sounds: Normal heart sounds.   Pulmonary:      Effort: Pulmonary effort is normal. No respiratory distress.      Breath sounds: Normal breath sounds. No wheezing or rhonchi.   Abdominal:      General: Bowel sounds are normal.      Palpations: Abdomen is soft.   Musculoskeletal:         General: Normal range of motion.      Cervical back: Normal range of motion.      Right lower leg: No edema.      Left lower leg: No edema.   Skin:     General: Skin is warm and dry.      Capillary Refill: Capillary refill takes less than 2 seconds.   Neurological:      Mental Status: She is alert. Mental status is at baseline.      Comments: Oriented x1   Psychiatric:         Mood and Affect: Mood normal.         Speech: Speech normal.         Behavior: Behavior is cooperative.         Cognition and Memory: Cognition is impaired. Memory is impaired.      Comments: forgetful         Assessment/Plan  Diagnoses and all orders for this visit:  Primary hypertension  Comments:  Blood pressure controlled.  Orders:  -     d-mannose 500 mg capsule; Take 1 capsule by mouth once daily.  Mixed hyperlipidemia  Comments:  Lipid panel reviewed from 9/10/2024 at AL. Continue atorvastatin.  Vitamin D deficiency  Comments:  Vitamin D level 39.83, obtained at AL on 9/10/24.  Vascular dementia without behavioral disturbance, psychotic disturbance, mood disturbance, or anxiety, unspecified dementia severity (Multi)  Comments:  Monitor for cognitive decline. Pt currently residing in  unsecured AL setting at [facility]. Continue to follow Alyson Dick Psych NP.  Diabetes mellitus without complication (Multi)  Comments:  A1C obtained at AL on 9/10/2024, results 6.5. Continue metformin.  Need for prophylaxis against urinary tract infection  -     cranberry fruit (cranberry) 450 mg tablet; Take 1 tablet by mouth once daily.  Active advance directive within chart  Comments:  DNR-CCA on file in assisted living chart.  KENNETH (obstructive sleep apnea)  Comments:  Continue to wear CPAP nightly.  Anxiety  Comments:  Continue to follow Alyson Dick NP with [facility].    *Flu vaccination to be administered at AL.           Electronically Signed By: HILTON Cutler-CNP   10/14/24 10:19 PM

## 2024-10-10 ENCOUNTER — TELEPHONE (OUTPATIENT)
Dept: PRIMARY CARE | Facility: CLINIC | Age: 84
End: 2024-10-10
Payer: MEDICARE

## 2024-10-10 NOTE — TELEPHONE ENCOUNTER
Spoke to Alyson Dick who sent me a copy of positive urine culture. Pt ordered Cipro 500 mg po bid for 5 days. Alyson made facility aware of orders.   Yumiko Marvin, APRN-CNP

## 2024-10-11 ENCOUNTER — HOSPITAL ENCOUNTER (EMERGENCY)
Facility: HOSPITAL | Age: 84
Discharge: HOME | End: 2024-10-11
Payer: MEDICARE

## 2024-10-11 VITALS
DIASTOLIC BLOOD PRESSURE: 62 MMHG | WEIGHT: 133 LBS | HEIGHT: 63 IN | HEART RATE: 61 BPM | OXYGEN SATURATION: 97 % | TEMPERATURE: 98.1 F | RESPIRATION RATE: 19 BRPM | SYSTOLIC BLOOD PRESSURE: 124 MMHG | BODY MASS INDEX: 23.57 KG/M2

## 2024-10-11 DIAGNOSIS — N39.0 ACUTE LOWER URINARY TRACT INFECTION: Primary | ICD-10-CM

## 2024-10-11 LAB
ALBUMIN SERPL BCP-MCNC: 4.4 G/DL (ref 3.4–5)
ALP SERPL-CCNC: 69 U/L (ref 33–136)
ALT SERPL W P-5'-P-CCNC: 20 U/L (ref 7–45)
ANION GAP SERPL CALCULATED.3IONS-SCNC: 13 MMOL/L (ref 10–20)
APPEARANCE UR: CLEAR
AST SERPL W P-5'-P-CCNC: 19 U/L (ref 9–39)
BACTERIA #/AREA URNS AUTO: ABNORMAL /HPF
BASOPHILS # BLD AUTO: 0.1 X10*3/UL (ref 0–0.1)
BASOPHILS NFR BLD AUTO: 1.2 %
BILIRUB SERPL-MCNC: 0.5 MG/DL (ref 0–1.2)
BILIRUB UR STRIP.AUTO-MCNC: NEGATIVE MG/DL
BUN SERPL-MCNC: 17 MG/DL (ref 6–23)
CALCIUM SERPL-MCNC: 9.2 MG/DL (ref 8.6–10.3)
CHLORIDE SERPL-SCNC: 99 MMOL/L (ref 98–107)
CO2 SERPL-SCNC: 25 MMOL/L (ref 21–32)
COLOR UR: ABNORMAL
CREAT SERPL-MCNC: 0.89 MG/DL (ref 0.5–1.05)
EGFRCR SERPLBLD CKD-EPI 2021: 65 ML/MIN/1.73M*2
EOSINOPHIL # BLD AUTO: 0.15 X10*3/UL (ref 0–0.4)
EOSINOPHIL NFR BLD AUTO: 1.9 %
ERYTHROCYTE [DISTWIDTH] IN BLOOD BY AUTOMATED COUNT: 12.8 % (ref 11.5–14.5)
GLUCOSE SERPL-MCNC: 164 MG/DL (ref 74–99)
GLUCOSE UR STRIP.AUTO-MCNC: NORMAL MG/DL
HCT VFR BLD AUTO: 36.6 % (ref 36–46)
HGB BLD-MCNC: 12.4 G/DL (ref 12–16)
IMM GRANULOCYTES # BLD AUTO: 0.02 X10*3/UL (ref 0–0.5)
IMM GRANULOCYTES NFR BLD AUTO: 0.2 % (ref 0–0.9)
KETONES UR STRIP.AUTO-MCNC: NEGATIVE MG/DL
LEUKOCYTE ESTERASE UR QL STRIP.AUTO: ABNORMAL
LIPASE SERPL-CCNC: 20 U/L (ref 9–82)
LYMPHOCYTES # BLD AUTO: 1.88 X10*3/UL (ref 0.8–3)
LYMPHOCYTES NFR BLD AUTO: 23.4 %
MCH RBC QN AUTO: 29.5 PG (ref 26–34)
MCHC RBC AUTO-ENTMCNC: 33.9 G/DL (ref 32–36)
MCV RBC AUTO: 87 FL (ref 80–100)
MONOCYTES # BLD AUTO: 0.74 X10*3/UL (ref 0.05–0.8)
MONOCYTES NFR BLD AUTO: 9.2 %
NEUTROPHILS # BLD AUTO: 5.16 X10*3/UL (ref 1.6–5.5)
NEUTROPHILS NFR BLD AUTO: 64.1 %
NITRITE UR QL STRIP.AUTO: NEGATIVE
NRBC BLD-RTO: 0 /100 WBCS (ref 0–0)
PH UR STRIP.AUTO: 5.5 [PH]
PLATELET # BLD AUTO: 256 X10*3/UL (ref 150–450)
POTASSIUM SERPL-SCNC: 4.3 MMOL/L (ref 3.5–5.3)
PROT SERPL-MCNC: 6.6 G/DL (ref 6.4–8.2)
PROT UR STRIP.AUTO-MCNC: NEGATIVE MG/DL
RBC # BLD AUTO: 4.2 X10*6/UL (ref 4–5.2)
RBC # UR STRIP.AUTO: NEGATIVE /UL
RBC #/AREA URNS AUTO: ABNORMAL /HPF
SODIUM SERPL-SCNC: 133 MMOL/L (ref 136–145)
SP GR UR STRIP.AUTO: 1.01
UROBILINOGEN UR STRIP.AUTO-MCNC: NORMAL MG/DL
WBC # BLD AUTO: 8.1 X10*3/UL (ref 4.4–11.3)
WBC #/AREA URNS AUTO: ABNORMAL /HPF

## 2024-10-11 PROCEDURE — 2500000004 HC RX 250 GENERAL PHARMACY W/ HCPCS (ALT 636 FOR OP/ED)

## 2024-10-11 PROCEDURE — 83690 ASSAY OF LIPASE: CPT

## 2024-10-11 PROCEDURE — 96365 THER/PROPH/DIAG IV INF INIT: CPT

## 2024-10-11 PROCEDURE — 85025 COMPLETE CBC W/AUTO DIFF WBC: CPT

## 2024-10-11 PROCEDURE — 81001 URINALYSIS AUTO W/SCOPE: CPT

## 2024-10-11 PROCEDURE — 80053 COMPREHEN METABOLIC PANEL: CPT

## 2024-10-11 PROCEDURE — 99284 EMERGENCY DEPT VISIT MOD MDM: CPT | Mod: 25

## 2024-10-11 PROCEDURE — 36415 COLL VENOUS BLD VENIPUNCTURE: CPT

## 2024-10-11 PROCEDURE — P9612 CATHETERIZE FOR URINE SPEC: HCPCS

## 2024-10-11 RX ORDER — CEFTRIAXONE 1 G/50ML
1 INJECTION, SOLUTION INTRAVENOUS ONCE
Status: COMPLETED | OUTPATIENT
Start: 2024-10-11 | End: 2024-10-11

## 2024-10-11 ASSESSMENT — PAIN SCALES - GENERAL
PAINLEVEL_OUTOF10: 0 - NO PAIN
PAINLEVEL_OUTOF10: 0 - NO PAIN

## 2024-10-11 ASSESSMENT — COLUMBIA-SUICIDE SEVERITY RATING SCALE - C-SSRS
1. IN THE PAST MONTH, HAVE YOU WISHED YOU WERE DEAD OR WISHED YOU COULD GO TO SLEEP AND NOT WAKE UP?: NO
6. HAVE YOU EVER DONE ANYTHING, STARTED TO DO ANYTHING, OR PREPARED TO DO ANYTHING TO END YOUR LIFE?: NO
2. HAVE YOU ACTUALLY HAD ANY THOUGHTS OF KILLING YOURSELF?: NO

## 2024-10-11 ASSESSMENT — PAIN - FUNCTIONAL ASSESSMENT: PAIN_FUNCTIONAL_ASSESSMENT: 0-10

## 2024-10-11 NOTE — ED PROVIDER NOTES
HPI   Chief Complaint   Patient presents with    Urinary Frequency     Patient sent in from [facility] for uti. Patient has been taking oral antibiotics but uit is not getting cleared. Per ems patient needs iv antibiotics. Vss and nad.        Patient is an 84-year-old female with history of GERD, dementia presenting from nursing facility via EMS with concerns for UTI.  She is presenting from [facility] reports of UTI.  They state that they tested her urine and it was still positive for UTI after oral antibiotic.  Patient states she is not having any UTI symptoms.  States he is not having no symptoms at all.  Patient denies fevers, chills, cough, sore throat, runny nose, chest pain, shortness of breath, abdominal pain, nausea, vomiting, diarrhea or urinary complaints.              Patient History   Past Medical History:   Diagnosis Date    Dementia     GERD (gastroesophageal reflux disease)     High cholesterol     Hypertension     Pre-diabetes     Right shoulder pain      Past Surgical History:   Procedure Laterality Date    CT ABDOMEN ANGIOGRAM W AND/OR WO IV CONTRAST  6/19/2013    CT ABDOMEN ANGIOGRAM W AND/OR WO IV CONTRAST [facility]    TOTAL SHOULDER ARTHROPLASTY  07/15/2016    Shoulder Arthroplasty Total Shoulder Replacement     Family History   Problem Relation Name Age of Onset    Breast cancer Mother          cause of death    Multiple sclerosis Mother      Other (bladder cancer) Father          cause of death    Liver disease Sister          End stage    Obesity Sister      Diabetes Sister      Hypothyroidism Sister      Hashimoto's thyroiditis Sister      Osteoporosis Sister      Dystonia Sister          Cervical    Other (Vascular dementia) Other          Father's second cousin    Other (lewy body dementia) Other          Father's side    Breast cancer Other          sister     Social History     Tobacco Use    Smoking status: Never    Smokeless tobacco: Never   Vaping Use    Vaping status: Never  Used   Substance Use Topics    Alcohol use: Yes     Comment: rarely    Drug use: Never       Physical Exam   ED Triage Vitals [10/11/24 1404]   Temperature Heart Rate Respirations BP   36.7 °C (98.1 °F) 67 15 129/69      Pulse Ox Temp Source Heart Rate Source Patient Position   95 % Oral -- --      BP Location FiO2 (%)     -- --       Physical Exam  Vitals and nursing note reviewed.   Constitutional:       Appearance: She is well-developed.      Comments: Awake, laying in examination bed   HENT:      Head: Normocephalic and atraumatic.      Nose: Nose normal.      Mouth/Throat:      Mouth: Mucous membranes are moist.      Pharynx: Oropharynx is clear.   Eyes:      Extraocular Movements: Extraocular movements intact.      Conjunctiva/sclera: Conjunctivae normal.      Pupils: Pupils are equal, round, and reactive to light.   Cardiovascular:      Rate and Rhythm: Normal rate and regular rhythm.      Pulses: Normal pulses.      Heart sounds: Normal heart sounds. No murmur heard.  Pulmonary:      Effort: Pulmonary effort is normal. No respiratory distress.      Breath sounds: Normal breath sounds.   Abdominal:      General: Abdomen is flat.      Palpations: Abdomen is soft.      Tenderness: There is no abdominal tenderness.   Musculoskeletal:         General: No swelling. Normal range of motion.      Cervical back: Normal range of motion and neck supple.   Skin:     General: Skin is warm and dry.      Capillary Refill: Capillary refill takes less than 2 seconds.   Neurological:      General: No focal deficit present.      Mental Status: She is alert. Mental status is at baseline.   Psychiatric:         Mood and Affect: Mood normal.         Behavior: Behavior normal.           ED Course & MDM   Diagnoses as of 10/11/24 1526   Acute lower urinary tract infection                 No data recorded                                 Medical Decision Making  Patient is an 84-year-old female with history of GERD, dementia  presenting from nursing facility via EMS with concerns for UTI.  Labwork, urine ordered.  Conditions considered include but are not limited to: Drug-resistant UTI, electro abnormality, anemia.    Attending physician was available for consultation on this patient.  CBC is without leukocytosis or anemia.  CMP without significant electrolyte abnormality or renal impairment.  Lipase within normal limits.  UA with micro is without infection but the nursing facility strongly requested IV antibiotics.  1 dose of IV antibiotics was given of Keflex and patient will be discharged without antibiotics.    I believe this patient is at low risk for complication, and a disposition of discharge is acceptable.  Return to the Emergency Department if new or worsening symptoms including headache, fever, chills, chest pain, shortness of breath, syncope, near syncope, abdominal pain, nausea, vomiting,  diarrhea, or worsening pain.  Patient is agreeable to a disposition of discharge with follow-up with primary care in the next several days.    Portions of this note made with Dragon software, please be mindful of potential grammatical errors.        Medications   cefTRIAXone (Rocephin) 1 g in dextrose (iso) IV 50 mL (0 g intravenous Stopped 10/11/24 2897)         Labs Reviewed   URINALYSIS WITH REFLEX MICROSCOPIC - Abnormal       Result Value    Color, Urine Light-Yellow      Appearance, Urine Clear      Specific Gravity, Urine 1.010      pH, Urine 5.5      Protein, Urine NEGATIVE      Glucose, Urine Normal      Blood, Urine NEGATIVE      Ketones, Urine NEGATIVE      Bilirubin, Urine NEGATIVE      Urobilinogen, Urine Normal      Nitrite, Urine NEGATIVE      Leukocyte Esterase, Urine 75 Roger/µL (*)    COMPREHENSIVE METABOLIC PANEL - Abnormal    Glucose 164 (*)     Sodium 133 (*)     Potassium 4.3      Chloride 99      Bicarbonate 25      Anion Gap 13      Urea Nitrogen 17      Creatinine 0.89      eGFR 65      Calcium 9.2      Albumin 4.4       Alkaline Phosphatase 69      Total Protein 6.6      AST 19      Bilirubin, Total 0.5      ALT 20     MICROSCOPIC ONLY, URINE - Abnormal    WBC, Urine 1-5      RBC, Urine 1-2      Bacteria, Urine 1+ (*)    LIPASE - Normal    Lipase 20      Narrative:     Venipuncture immediately after or during the administration of Metamizole may lead to falsely low results. Testing should be performed immediately prior to Metamizole dosing.   CBC WITH AUTO DIFFERENTIAL    WBC 8.1      nRBC 0.0      RBC 4.20      Hemoglobin 12.4      Hematocrit 36.6      MCV 87      MCH 29.5      MCHC 33.9      RDW 12.8      Platelets 256      Neutrophils % 64.1      Immature Granulocytes %, Automated 0.2      Lymphocytes % 23.4      Monocytes % 9.2      Eosinophils % 1.9      Basophils % 1.2      Neutrophils Absolute 5.16      Immature Granulocytes Absolute, Automated 0.02      Lymphocytes Absolute 1.88      Monocytes Absolute 0.74      Eosinophils Absolute 0.15      Basophils Absolute 0.10           Procedure  Procedures     Cirilo Henriquez PA-C  10/11/24 0562

## 2024-10-12 VITALS
SYSTOLIC BLOOD PRESSURE: 138 MMHG | RESPIRATION RATE: 16 BRPM | BODY MASS INDEX: 24.63 KG/M2 | HEART RATE: 61 BPM | OXYGEN SATURATION: 97 % | WEIGHT: 139 LBS | HEIGHT: 63 IN | DIASTOLIC BLOOD PRESSURE: 60 MMHG

## 2024-10-12 ASSESSMENT — ACTIVITIES OF DAILY LIVING (ADL)
TOILETING: INDEPENDENT
GROCERY SHOPPING: TOTAL CARE
USING TELEPHONE: INDEPENDENT
HEARING - LEFT EAR: FUNCTIONAL
DOING HOUSEWORK: TOTAL CARE
PILL BOX USED: NO
FEEDING YOURSELF: INDEPENDENT
DRESSING YOURSELF: INDEPENDENT
BATHING: INDEPENDENT
NEEDS ASSISTANCE WITH FOOD: INDEPENDENT
STIL DRIVING: NO
EATING: INDEPENDENT
JUDGMENT_ADEQUATE_SAFELY_COMPLETE_DAILY_ACTIVITIES: YES
HEARING - RIGHT EAR: FUNCTIONAL
TAKING MEDICATION: TOTAL CARE
MANAGING FINANCES: TOTAL CARE
WALKS IN HOME: INDEPENDENT
GROOMING: INDEPENDENT
PREPARING MEALS: TOTAL CARE
PATIENT'S MEMORY ADEQUATE TO SAFELY COMPLETE DAILY ACTIVITIES?: YES
ADEQUATE_TO_COMPLETE_ADL: YES
USING TRANSPORTATION: TOTAL CARE

## 2024-10-12 ASSESSMENT — ENCOUNTER SYMPTOMS
FREQUENCY: 0
FEVER: 0
APPETITE CHANGE: 0
FATIGUE: 0
VOMITING: 0
CONSTIPATION: 0
SORE THROAT: 0
NAUSEA: 0
SHORTNESS OF BREATH: 0
CHILLS: 0
NERVOUS/ANXIOUS: 0
OCCASIONAL FEELINGS OF UNSTEADINESS: 0
DEPRESSION: 0
RHINORRHEA: 0
COUGH: 0
WEAKNESS: 0
DIARRHEA: 0
HEADACHES: 0
ACTIVITY CHANGE: 0
LOSS OF SENSATION IN FEET: 0

## 2024-10-12 NOTE — PROGRESS NOTES
Subjective   Patient ID: Liv Saucedo is a 84 y.o. female who is assisted living/ home patient being seen at [facility], and evaluated to Establish care/Medicare Wellness Exam.     HPI     Pt visited in apartment of assisted living. Pt oriented x1, comfortable and denies pain. Pt with hx of dementia, able to answer simple yes and no questions. Difficulty finding words at time when explaining self. Pt is forgetful at times as reported by nursing. Pt denies changes in vision, wears glasses. Denies changes I hearing, appears to hear normal tone of voice. States appetite is good, denies difficulty with chewing and swallowing. Pt denies headache, dizziness, congestion, runny nose and sore throat.     Pt denies chest pain, and sob at rest/exertion. Pt denies voiding symptoms, and constipation. Pt with hx of UTI's. Pt denies sleep disturbances. Pt follows psych for medication and symptom management of dementia. Pt ambulates independently. Denies any recent falls. Pt able to perform ADL's independently.    Current Outpatient Medications on File Prior to Visit   Medication Sig Dispense Refill    amLODIPine (Norvasc) 5 mg tablet TAKE 1 TABLET BY MOUTH ONCE  DAILY 90 tablet 3    atorvastatin (Lipitor) 10 mg tablet Take 1 tablet (10 mg) by mouth once daily. 90 tablet 1    calcium carbonate/vitamin D3 (CALCIUM 600 WITH VITAMIN D3 ORAL) Take by mouth.      cholecalciferol (Vitamin D-3) 25 MCG (1000 UT) capsule Take 1 capsule (25 mcg) by mouth once daily.      donepezil (Aricept) 10 mg tablet TAKE 1 TABLET BY MOUTH ONCE  DAILY 90 tablet 3    lisinopril 5 mg tablet TAKE 1 TABLET BY MOUTH ONCE  DAILY (Patient taking differently: Take 2 tablets (10 mg) by mouth once daily.) 90 tablet 3    loperamide (Imodium) 2 mg capsule Take 2 capsules (4 mg) by mouth once daily as needed for diarrhea.      magnesium hydroxide (Milk of Magnesia) 400 mg/5 mL suspension Take 30 mL by mouth once daily as needed.      memantine (Namenda)  "10 mg tablet Take 1 tablet (10 mg) by mouth 2 times a day.      metFORMIN  mg 24 hr tablet Take 1 tablet (500 mg) by mouth once daily in the evening. Take with meals. 90 tablet 1    mirtazapine (Remeron) 7.5 mg tablet TAKE 1 TABLET BY MOUTH AT  BEDTIME 90 tablet 3    QUEtiapine (SeroqueL) 25 mg tablet Take 1 tablet (25 mg) by mouth once daily. 30 tablet 3    escitalopram (Lexapro) 5 mg tablet Take 1 tablet (5 mg) by mouth once daily. (Patient not taking: Reported on 10/12/2024) 30 tablet 2     No current facility-administered medications on file prior to visit.      Review of Systems   Constitutional:  Negative for activity change, appetite change, chills, fatigue and fever.   HENT:  Negative for congestion, rhinorrhea and sore throat.    Respiratory:  Negative for cough and shortness of breath.    Cardiovascular:  Negative for chest pain and leg swelling.   Gastrointestinal:  Negative for constipation, diarrhea, nausea and vomiting.   Genitourinary:  Negative for frequency.   Musculoskeletal:  Negative for gait problem.   Neurological:  Negative for weakness and headaches.   Psychiatric/Behavioral:  The patient is not nervous/anxious.        Objective   /60 (BP Location: Right arm, Patient Position: Sitting, BP Cuff Size: Adult)   Pulse 61   Resp 16   Ht 1.6 m (5' 3\")   Wt 63 kg (139 lb)   SpO2 97%   BMI 24.62 kg/m²     Lab Results   Component Value Date    GLUCOSE 164 (H) 10/11/2024    CALCIUM 9.2 10/11/2024     (L) 10/11/2024    K 4.3 10/11/2024    CO2 25 10/11/2024    CL 99 10/11/2024    BUN 17 10/11/2024    CREATININE 0.89 10/11/2024      Lab Results   Component Value Date    WBC 8.1 10/11/2024    HGB 12.4 10/11/2024    HCT 36.6 10/11/2024    MCV 87 10/11/2024     10/11/2024      Lab Results   Component Value Date    [id number] 511 04/23/2024      Physical Exam  Constitutional:       General: She is awake. She is not in acute distress.     Appearance: Normal appearance. She is not " ill-appearing.   HENT:      Head: Normocephalic.      Nose: Nose normal. No congestion or rhinorrhea.      Mouth/Throat:      Mouth: Mucous membranes are moist.   Eyes:      Conjunctiva/sclera: Conjunctivae normal.      Pupils: Pupils are equal, round, and reactive to light.   Cardiovascular:      Rate and Rhythm: Normal rate and regular rhythm.      Pulses: Normal pulses.      Heart sounds: Normal heart sounds.   Pulmonary:      Effort: Pulmonary effort is normal. No respiratory distress.      Breath sounds: Normal breath sounds. No wheezing or rhonchi.   Abdominal:      General: Bowel sounds are normal.      Palpations: Abdomen is soft.   Musculoskeletal:         General: Normal range of motion.      Cervical back: Normal range of motion.      Right lower leg: No edema.      Left lower leg: No edema.   Skin:     General: Skin is warm and dry.      Capillary Refill: Capillary refill takes less than 2 seconds.   Neurological:      Mental Status: She is alert. Mental status is at baseline.      Comments: Oriented x1   Psychiatric:         Mood and Affect: Mood normal.         Speech: Speech normal.         Behavior: Behavior is cooperative.         Cognition and Memory: Cognition is impaired. Memory is impaired.      Comments: forgetful         Assessment/Plan   Diagnoses and all orders for this visit:  Primary hypertension  Comments:  Blood pressure controlled.  Orders:  -     d-mannose 500 mg capsule; Take 1 capsule by mouth once daily.  Mixed hyperlipidemia  Comments:  Lipid panel reviewed from 9/10/2024 at [facility]. Continue atorvastatin.  Vitamin D deficiency  Comments:  Vitamin D level 39.83, obtained at AL on 9/10/24.  Vascular dementia without behavioral disturbance, psychotic disturbance, mood disturbance, or anxiety, unspecified dementia severity (Multi)  Comments:  Monitor for cognitive decline. Pt currently residing in unsecured AL setting at [facility]. Continue to follow Aly[facility] Dick Psych  NP.  Diabetes mellitus without complication (Multi)  Comments:  A1C obtained at AL on 9/10/2024, results 6.5. Continue metformin.  Need for prophylaxis against urinary tract infection  -     cranberry fruit (cranberry) 450 mg tablet; Take 1 tablet by mouth once daily.  Active advance directive within chart  Comments:  DNR-CCA on file in assisted living chart.  KENNETH (obstructive sleep apnea)  Comments:  Continue to wear CPAP nightly.  Anxiety  Comments:  Continue to follow Alyson Dick NP with [facility].    *Flu vaccination to be administered at AL.

## 2024-10-14 PROBLEM — Z29.89 NEED FOR PROPHYLAXIS AGAINST URINARY TRACT INFECTION: Status: ACTIVE | Noted: 2024-10-14

## 2024-10-14 PROBLEM — Z78.9: Status: ACTIVE | Noted: 2024-10-14

## 2024-10-14 RX ORDER — CRANBERRY FRUIT 450 MG
1 TABLET ORAL DAILY
Qty: 90 TABLET | Refills: 3
Start: 2024-10-14

## 2024-11-05 ENCOUNTER — APPOINTMENT (OUTPATIENT)
Dept: PRIMARY CARE | Facility: CLINIC | Age: 84
End: 2024-11-05
Payer: MEDICARE

## 2024-11-14 ENCOUNTER — TELEPHONE (OUTPATIENT)
Dept: PRIMARY CARE | Facility: CLINIC | Age: 84
End: 2024-11-14
Payer: MEDICARE

## 2024-11-14 DIAGNOSIS — N64.4 BREAST TENDERNESS: ICD-10-CM

## 2024-11-14 DIAGNOSIS — Z12.31 ENCOUNTER FOR SCREENING MAMMOGRAM FOR BREAST CANCER: ICD-10-CM

## 2024-11-14 DIAGNOSIS — R92.8 ABNORMAL FINDING ON BREAST IMAGING: Primary | ICD-10-CM

## 2024-11-20 ENCOUNTER — TELEPHONE (OUTPATIENT)
Dept: PRIMARY CARE | Facility: CLINIC | Age: 84
End: 2024-11-20
Payer: MEDICARE

## 2024-11-20 DIAGNOSIS — N64.4 BREAST TENDERNESS: Primary | ICD-10-CM

## 2024-11-20 NOTE — TELEPHONE ENCOUNTER
You placed the order for mammogram on 11/14 in response to pt c/o breast tenderness. Ordered as routine in error.

## 2024-11-25 ENCOUNTER — APPOINTMENT (OUTPATIENT)
Dept: RADIOLOGY | Facility: CLINIC | Age: 84
End: 2024-11-25
Payer: MEDICARE

## 2024-12-11 ENCOUNTER — APPOINTMENT (OUTPATIENT)
Dept: PRIMARY CARE | Facility: CLINIC | Age: 84
End: 2024-12-11
Payer: MEDICARE

## 2024-12-19 ENCOUNTER — HOSPITAL ENCOUNTER (OUTPATIENT)
Dept: RADIOLOGY | Facility: HOSPITAL | Age: 84
Discharge: HOME | End: 2024-12-19
Payer: MEDICARE

## 2024-12-19 VITALS — BODY MASS INDEX: 24.84 KG/M2 | WEIGHT: 135 LBS | HEIGHT: 62 IN

## 2024-12-19 DIAGNOSIS — Z12.31 ENCOUNTER FOR SCREENING MAMMOGRAM FOR BREAST CANCER: ICD-10-CM

## 2024-12-19 DIAGNOSIS — R92.8 ABNORMAL FINDING ON BREAST IMAGING: ICD-10-CM

## 2024-12-19 DIAGNOSIS — N64.4 BREAST TENDERNESS: ICD-10-CM

## 2024-12-19 PROCEDURE — 77066 DX MAMMO INCL CAD BI: CPT

## 2024-12-19 PROCEDURE — 76642 ULTRASOUND BREAST LIMITED: CPT | Mod: RT

## 2024-12-19 PROCEDURE — 77066 DX MAMMO INCL CAD BI: CPT | Performed by: RADIOLOGY

## 2024-12-19 PROCEDURE — G0279 TOMOSYNTHESIS, MAMMO: HCPCS | Performed by: RADIOLOGY

## 2024-12-19 PROCEDURE — 76642 ULTRASOUND BREAST LIMITED: CPT | Performed by: RADIOLOGY

## 2025-01-10 ENCOUNTER — APPOINTMENT (OUTPATIENT)
Dept: PRIMARY CARE | Facility: CLINIC | Age: 85
End: 2025-01-10
Payer: MEDICARE

## 2025-01-27 ENCOUNTER — TELEPHONE (OUTPATIENT)
Dept: PRIMARY CARE | Facility: CLINIC | Age: 85
End: 2025-01-27
Payer: MEDICARE

## 2025-03-05 ENCOUNTER — APPOINTMENT (OUTPATIENT)
Dept: SLEEP MEDICINE | Facility: CLINIC | Age: 85
End: 2025-03-05
Payer: MEDICARE

## 2025-03-07 ENCOUNTER — NURSING HOME VISIT (OUTPATIENT)
Dept: POST ACUTE CARE | Facility: EXTERNAL LOCATION | Age: 85
End: 2025-03-07
Payer: MEDICARE

## 2025-03-07 VITALS
OXYGEN SATURATION: 97 % | HEIGHT: 62 IN | WEIGHT: 133.6 LBS | DIASTOLIC BLOOD PRESSURE: 70 MMHG | RESPIRATION RATE: 16 BRPM | SYSTOLIC BLOOD PRESSURE: 120 MMHG | HEART RATE: 82 BPM | BODY MASS INDEX: 24.59 KG/M2

## 2025-03-07 DIAGNOSIS — E11.9 DIABETES MELLITUS WITHOUT COMPLICATION (MULTI): ICD-10-CM

## 2025-03-07 DIAGNOSIS — F41.9 ANXIETY: ICD-10-CM

## 2025-03-07 DIAGNOSIS — I10 PRIMARY HYPERTENSION: Primary | ICD-10-CM

## 2025-03-07 DIAGNOSIS — G47.33 OSA (OBSTRUCTIVE SLEEP APNEA): ICD-10-CM

## 2025-03-07 DIAGNOSIS — Z78.9 LIVING IN ASSISTED LIVING: ICD-10-CM

## 2025-03-07 DIAGNOSIS — E78.2 MIXED HYPERLIPIDEMIA: ICD-10-CM

## 2025-03-07 DIAGNOSIS — E03.8 SUBCLINICAL HYPOTHYROIDISM: ICD-10-CM

## 2025-03-07 DIAGNOSIS — F01.50 VASCULAR DEMENTIA WITHOUT BEHAVIORAL DISTURBANCE, PSYCHOTIC DISTURBANCE, MOOD DISTURBANCE, OR ANXIETY, UNSPECIFIED DEMENTIA SEVERITY (MULTI): ICD-10-CM

## 2025-03-07 DIAGNOSIS — E55.9 VITAMIN D DEFICIENCY: ICD-10-CM

## 2025-03-07 PROCEDURE — 99349 HOME/RES VST EST MOD MDM 40: CPT

## 2025-03-07 RX ORDER — VENLAFAXINE 25 MG/1
25 TABLET ORAL DAILY
COMMUNITY
Start: 2025-01-17

## 2025-03-07 ASSESSMENT — ENCOUNTER SYMPTOMS
VOMITING: 0
SORE THROAT: 0
WEAKNESS: 0
NAUSEA: 0
ACTIVITY CHANGE: 0
CONSTIPATION: 0
FREQUENCY: 0
CHILLS: 0
COUGH: 0
FEVER: 0
RHINORRHEA: 0
DIARRHEA: 0
APPETITE CHANGE: 0
FATIGUE: 0
DIZZINESS: 0
SHORTNESS OF BREATH: 0
SLEEP DISTURBANCE: 0
HEADACHES: 0
NERVOUS/ANXIOUS: 0

## 2025-03-07 ASSESSMENT — PAIN SCALES - GENERAL: PAINLEVEL_OUTOF10: 0-NO PAIN

## 2025-03-07 NOTE — LETTER
"Patient: Liv Saucedo  : 1940    Encounter Date: 2025    Subjective  Patient ID: Liv Saucedo is a 84 y.o. female who is assisted living/ home patient being seen at [facility], and evaluated Routine AL Visit.     HPI     Pt visited in apartment of assisted living. Pt oriented x1, became frustrated when couldn't remember current year or month. Pt able to answer simple questions. Pauses to think of response, and then replies. Sometimes has difficult time finding the words. Pt denies changes in vision, and wears glasses. Denies changes in hearing, appears to hear normal tone of voice. States appetite is \"fabulous\", denies difficulty with chewing and swallowing. Goes to dining room for all meals. Pt denies headache, dizziness, congestion, cough runny nose and sore throat.     Pt denies chest tenderness, and sob at rest/exertion. Pt states \"I walked two miles today, and I'm good\". Pt denies voiding symptoms, and constipation. Pt states she is regular and has no issues.  Denies sleep disturbances, wears CPAP at bedtime nightly. Pt follows psych for medication and symptom management of dementia. Pt ambulates independently, takes frequent walks daily outside. Denies any recent falls. Pt able to perform ADL's independently. Pt states spouse visits often. Pt states \"He loves me, and is alert and caring. He is worried I will risk being injured if I was at home\". Pt without any concerns at this time.     Current Outpatient Medications on File Prior to Visit   Medication Sig Dispense Refill   • amLODIPine (Norvasc) 5 mg tablet TAKE 1 TABLET BY MOUTH ONCE  DAILY 90 tablet 3   • atorvastatin (Lipitor) 10 mg tablet Take 1 tablet (10 mg) by mouth once daily. 90 tablet 1   • calcium carbonate/vitamin D3 (CALCIUM 600 WITH VITAMIN D3 ORAL) Take by mouth.     • cholecalciferol (Vitamin D-3) 25 MCG (1000 UT) capsule Take 1 capsule (25 mcg) by mouth once daily.     • cranberry fruit (cranberry) 450 mg " tablet Take 1 tablet by mouth once daily. 90 tablet 3   • d-mannose 500 mg capsule Take 1 capsule by mouth once daily. 90 capsule 3   • donepezil (Aricept) 10 mg tablet TAKE 1 TABLET BY MOUTH ONCE  DAILY 90 tablet 3   • lisinopril 5 mg tablet TAKE 1 TABLET BY MOUTH ONCE  DAILY (Patient taking differently: Take 2 tablets (10 mg) by mouth once daily.) 90 tablet 3   • loperamide (Imodium) 2 mg capsule Take 2 capsules (4 mg) by mouth once daily as needed for diarrhea.     • magnesium hydroxide (Milk of Magnesia) 400 mg/5 mL suspension Take 30 mL by mouth once daily as needed.     • memantine (Namenda) 10 mg tablet Take 1 tablet (10 mg) by mouth 2 times a day.     • metFORMIN  mg 24 hr tablet Take 1 tablet (500 mg) by mouth once daily in the evening. Take with meals. 90 tablet 1   • mirtazapine (Remeron) 7.5 mg tablet TAKE 1 TABLET BY MOUTH AT  BEDTIME 90 tablet 3   • QUEtiapine (SeroqueL) 25 mg tablet Take 1 tablet (25 mg) by mouth once daily. 30 tablet 3   • venlafaxine (Effexor) 25 mg tablet Take 1 tablet (25 mg) by mouth once daily.     • [DISCONTINUED] escitalopram (Lexapro) 5 mg tablet Take 1 tablet (5 mg) by mouth once daily. (Patient not taking: Reported on 10/12/2024) 30 tablet 2     No current facility-administered medications on file prior to visit.      Review of Systems   Constitutional:  Negative for activity change, appetite change, chills, fatigue and fever.   HENT:  Negative for congestion, rhinorrhea and sore throat.    Respiratory:  Negative for cough and shortness of breath.    Cardiovascular:  Negative for chest pain and leg swelling.   Gastrointestinal:  Negative for constipation, diarrhea, nausea and vomiting.   Genitourinary:  Negative for frequency.   Musculoskeletal:  Negative for gait problem.   Neurological:  Negative for dizziness, weakness and headaches.   Psychiatric/Behavioral:  Negative for sleep disturbance. The patient is not nervous/anxious.        Objective  /70   Pulse  "82   Resp 16   Ht 1.575 m (5' 2\")   Wt 60.6 kg (133 lb 9.6 oz)   SpO2 97%   BMI 24.44 kg/m²       Chemistry    Lab Results   Component Value Date/Time     (L) 10/11/2024 1410    K 4.3 10/11/2024 1410    CL 99 10/11/2024 1410    CO2 25 10/11/2024 1410    BUN 17 10/11/2024 1410    CREATININE 0.89 10/11/2024 1410    Lab Results   Component Value Date/Time    CALCIUM 9.2 10/11/2024 1410    ALKPHOS 69 10/11/2024 1410    AST 19 10/11/2024 1410    ALT 20 10/11/2024 1410    BILITOT 0.5 10/11/2024 1410         Lab Results   Component Value Date    WBC 8.1 10/11/2024    HGB 12.4 10/11/2024    HCT 36.6 10/11/2024    MCV 87 10/11/2024     10/11/2024      Physical Exam  Constitutional:       General: She is awake. She is not in acute distress.     Appearance: Normal appearance. She is normal weight. She is not ill-appearing.   HENT:      Head: Normocephalic.      Nose: Nose normal. No congestion or rhinorrhea.      Mouth/Throat:      Mouth: Mucous membranes are moist.   Eyes:      Conjunctiva/sclera: Conjunctivae normal.      Pupils: Pupils are equal, round, and reactive to light.      Comments: glasses   Cardiovascular:      Rate and Rhythm: Normal rate and regular rhythm.      Pulses: Normal pulses.      Heart sounds: Normal heart sounds.   Pulmonary:      Effort: Pulmonary effort is normal. No respiratory distress.      Breath sounds: Normal breath sounds. No wheezing or rhonchi.   Abdominal:      General: Bowel sounds are normal.      Palpations: Abdomen is soft.   Musculoskeletal:         General: Normal range of motion.      Cervical back: Normal range of motion.      Right lower leg: No edema.      Left lower leg: No edema.   Skin:     General: Skin is warm and dry.      Capillary Refill: Capillary refill takes less than 2 seconds.   Neurological:      Mental Status: She is alert. Mental status is at baseline.      Comments: Oriented x1   Psychiatric:         Mood and Affect: Mood normal.         Speech: " Speech normal.         Behavior: Behavior is cooperative.         Cognition and Memory: Cognition is impaired. Memory is impaired.      Comments: forgetful         Assessment/Plan  Diagnoses and all orders for this visit:  Primary hypertension  Comments:  Blood pressure controlled. Continue lisinopril. Order written for BMP.  Subclinical hypothyroidism  Comments:  Moody to obtain TSH, due 4/2025. Order written.  Vascular dementia without behavioral disturbance, psychotic disturbance, mood disturbance, or anxiety, unspecified dementia severity (Multi)  Comments:  Continue to follow mental health provider for symptom and medication mgmt.  Vitamin D deficiency  Comments:  Continue Vitamin D. Vitamin D level obtained 9/10/2024 at AL.  Mixed hyperlipidemia  Comments:  Lipid panel obtained 9/2024 at assisted [facility]. Normal results.  Diabetes mellitus without complication (Multi)  Comments:  A1C obtained 9/10. Assisted living to obtain A1C. Orders written.  Anxiety  Comments:  Continue to follow psych np for medication and symptom mgmt.  KENNETH (obstructive sleep apnea)  Comments:  Compliant with wearing cpap at bedtime.  Living in assisted living  Comments:  Medical and facility chart reviewed, medications reconciled and collaboration with nursing.            Electronically Signed By: HILTON Cutler-CNP   3/7/25  8:27 PM

## 2025-03-08 NOTE — PROGRESS NOTES
"Subjective   Patient ID: Liv Saucedo is a 84 y.o. female who is assisted living/ home patient being seen at [facility], and evaluated Routine AL Visit.     HPI     Pt visited in apartment of assisted living. Pt oriented x1, became frustrated when couldn't remember current year or month. Pt able to answer simple questions. Pauses to think of response, and then replies. Sometimes has difficult time finding the words. Pt denies changes in vision, and wears glasses. Denies changes in hearing, appears to hear normal tone of voice. States appetite is \"fabulous\", denies difficulty with chewing and swallowing. Goes to dining room for all meals. Pt denies headache, dizziness, congestion, cough runny nose and sore throat.     Pt denies chest tenderness, and sob at rest/exertion. Pt states \"I walked two miles today, and I'm good\". Pt denies voiding symptoms, and constipation. Pt states she is regular and has no issues.  Denies sleep disturbances, wears CPAP at bedtime nightly. Pt follows psych for medication and symptom management of dementia. Pt ambulates independently, takes frequent walks daily outside. Denies any recent falls. Pt able to perform ADL's independently. Pt states spouse visits often. Pt states \"He loves me, and is alert and caring. He is worried I will risk being injured if I was at home\". Pt without any concerns at this time.     Current Outpatient Medications on File Prior to Visit   Medication Sig Dispense Refill    amLODIPine (Norvasc) 5 mg tablet TAKE 1 TABLET BY MOUTH ONCE  DAILY 90 tablet 3    atorvastatin (Lipitor) 10 mg tablet Take 1 tablet (10 mg) by mouth once daily. 90 tablet 1    calcium carbonate/vitamin D3 (CALCIUM 600 WITH VITAMIN D3 ORAL) Take by mouth.      cholecalciferol (Vitamin D-3) 25 MCG (1000 UT) capsule Take 1 capsule (25 mcg) by mouth once daily.      cranberry fruit (cranberry) 450 mg tablet Take 1 tablet by mouth once daily. 90 tablet 3    d-mannose 500 mg capsule " "Take 1 capsule by mouth once daily. 90 capsule 3    donepezil (Aricept) 10 mg tablet TAKE 1 TABLET BY MOUTH ONCE  DAILY 90 tablet 3    lisinopril 5 mg tablet TAKE 1 TABLET BY MOUTH ONCE  DAILY (Patient taking differently: Take 2 tablets (10 mg) by mouth once daily.) 90 tablet 3    loperamide (Imodium) 2 mg capsule Take 2 capsules (4 mg) by mouth once daily as needed for diarrhea.      magnesium hydroxide (Milk of Magnesia) 400 mg/5 mL suspension Take 30 mL by mouth once daily as needed.      memantine (Namenda) 10 mg tablet Take 1 tablet (10 mg) by mouth 2 times a day.      metFORMIN  mg 24 hr tablet Take 1 tablet (500 mg) by mouth once daily in the evening. Take with meals. 90 tablet 1    mirtazapine (Remeron) 7.5 mg tablet TAKE 1 TABLET BY MOUTH AT  BEDTIME 90 tablet 3    QUEtiapine (SeroqueL) 25 mg tablet Take 1 tablet (25 mg) by mouth once daily. 30 tablet 3    venlafaxine (Effexor) 25 mg tablet Take 1 tablet (25 mg) by mouth once daily.      [DISCONTINUED] escitalopram (Lexapro) 5 mg tablet Take 1 tablet (5 mg) by mouth once daily. (Patient not taking: Reported on 10/12/2024) 30 tablet 2     No current facility-administered medications on file prior to visit.      Review of Systems   Constitutional:  Negative for activity change, appetite change, chills, fatigue and fever.   HENT:  Negative for congestion, rhinorrhea and sore throat.    Respiratory:  Negative for cough and shortness of breath.    Cardiovascular:  Negative for chest pain and leg swelling.   Gastrointestinal:  Negative for constipation, diarrhea, nausea and vomiting.   Genitourinary:  Negative for frequency.   Musculoskeletal:  Negative for gait problem.   Neurological:  Negative for dizziness, weakness and headaches.   Psychiatric/Behavioral:  Negative for sleep disturbance. The patient is not nervous/anxious.        Objective   /70   Pulse 82   Resp 16   Ht 1.575 m (5' 2\")   Wt 60.6 kg (133 lb 9.6 oz)   SpO2 97%   BMI 24.44 " kg/m²       Chemistry    Lab Results   Component Value Date/Time     (L) 10/11/2024 1410    K 4.3 10/11/2024 1410    CL 99 10/11/2024 1410    CO2 25 10/11/2024 1410    BUN 17 10/11/2024 1410    CREATININE 0.89 10/11/2024 1410    Lab Results   Component Value Date/Time    CALCIUM 9.2 10/11/2024 1410    ALKPHOS 69 10/11/2024 1410    AST 19 10/11/2024 1410    ALT 20 10/11/2024 1410    BILITOT 0.5 10/11/2024 1410         Lab Results   Component Value Date    WBC 8.1 10/11/2024    HGB 12.4 10/11/2024    HCT 36.6 10/11/2024    MCV 87 10/11/2024     10/11/2024      Physical Exam  Constitutional:       General: She is awake. She is not in acute distress.     Appearance: Normal appearance. She is normal weight. She is not ill-appearing.   HENT:      Head: Normocephalic.      Nose: Nose normal. No congestion or rhinorrhea.      Mouth/Throat:      Mouth: Mucous membranes are moist.   Eyes:      Conjunctiva/sclera: Conjunctivae normal.      Pupils: Pupils are equal, round, and reactive to light.      Comments: glasses   Cardiovascular:      Rate and Rhythm: Normal rate and regular rhythm.      Pulses: Normal pulses.      Heart sounds: Normal heart sounds.   Pulmonary:      Effort: Pulmonary effort is normal. No respiratory distress.      Breath sounds: Normal breath sounds. No wheezing or rhonchi.   Abdominal:      General: Bowel sounds are normal.      Palpations: Abdomen is soft.   Musculoskeletal:         General: Normal range of motion.      Cervical back: Normal range of motion.      Right lower leg: No edema.      Left lower leg: No edema.   Skin:     General: Skin is warm and dry.      Capillary Refill: Capillary refill takes less than 2 seconds.   Neurological:      Mental Status: She is alert. Mental status is at baseline.      Comments: Oriented x1   Psychiatric:         Mood and Affect: Mood normal.         Speech: Speech normal.         Behavior: Behavior is cooperative.         Cognition and Memory:  Cognition is impaired. Memory is impaired.      Comments: forgetful         Assessment/Plan   Diagnoses and all orders for this visit:  Primary hypertension  Comments:  Blood pressure controlled. Continue lisinopril. Order written for BMP.  Subclinical hypothyroidism  Comments:  Gregg to obtain TSH, due 4/2025. Order written.  Vascular dementia without behavioral disturbance, psychotic disturbance, mood disturbance, or anxiety, unspecified dementia severity (Multi)  Comments:  Continue to follow mental health provider for symptom and medication mgmt.  Vitamin D deficiency  Comments:  Continue Vitamin D. Vitamin D level obtained 9/10/2024 at AL.  Mixed hyperlipidemia  Comments:  Lipid panel obtained 9/2024 at [facility]. Normal results.  Diabetes mellitus without complication (Multi)  Comments:  A1C obtained 9/10. Assisted living to obtain A1C. Orders written.  Anxiety  Comments:  Continue to follow psych np for medication and symptom mgmt.  KENNETH (obstructive sleep apnea)  Comments:  Compliant with wearing cpap at bedtime.  Living in assisted living  Comments:  Medical and facility chart reviewed, medications reconciled and collaboration with nursing.

## 2025-07-22 ENCOUNTER — APPOINTMENT (OUTPATIENT)
Dept: CARDIOLOGY | Facility: HOSPITAL | Age: 85
DRG: 312 | End: 2025-07-22
Payer: MEDICARE

## 2025-07-22 ENCOUNTER — APPOINTMENT (OUTPATIENT)
Dept: RADIOLOGY | Facility: HOSPITAL | Age: 85
DRG: 312 | End: 2025-07-22
Payer: MEDICARE

## 2025-07-22 ENCOUNTER — HOSPITAL ENCOUNTER (EMERGENCY)
Facility: HOSPITAL | Age: 85
Discharge: HOME | DRG: 312 | End: 2025-07-22
Attending: EMERGENCY MEDICINE
Payer: MEDICARE

## 2025-07-22 VITALS
BODY MASS INDEX: 24.84 KG/M2 | HEART RATE: 65 BPM | DIASTOLIC BLOOD PRESSURE: 77 MMHG | SYSTOLIC BLOOD PRESSURE: 155 MMHG | HEIGHT: 62 IN | TEMPERATURE: 97.9 F | OXYGEN SATURATION: 97 % | WEIGHT: 135 LBS | RESPIRATION RATE: 17 BRPM

## 2025-07-22 DIAGNOSIS — I10 HYPERTENSION, UNSPECIFIED TYPE: ICD-10-CM

## 2025-07-22 DIAGNOSIS — R07.9 CHEST PAIN, UNSPECIFIED TYPE: Primary | ICD-10-CM

## 2025-07-22 LAB
ALBUMIN SERPL BCP-MCNC: 4.6 G/DL (ref 3.4–5)
ALP SERPL-CCNC: 65 U/L (ref 33–136)
ALT SERPL W P-5'-P-CCNC: 15 U/L (ref 7–45)
ANION GAP SERPL CALCULATED.3IONS-SCNC: 11 MMOL/L (ref 10–20)
AST SERPL W P-5'-P-CCNC: 17 U/L (ref 9–39)
ATRIAL RATE: 57 BPM
BASOPHILS # BLD AUTO: 0.06 X10*3/UL (ref 0–0.1)
BASOPHILS NFR BLD AUTO: 0.9 %
BILIRUB SERPL-MCNC: 0.7 MG/DL (ref 0–1.2)
BNP SERPL-MCNC: 50 PG/ML (ref 0–99)
BUN SERPL-MCNC: 13 MG/DL (ref 6–23)
CALCIUM SERPL-MCNC: 9.4 MG/DL (ref 8.6–10.3)
CARDIAC TROPONIN I PNL SERPL HS: 3 NG/L (ref 0–13)
CARDIAC TROPONIN I PNL SERPL HS: <3 NG/L (ref 0–13)
CHLORIDE SERPL-SCNC: 99 MMOL/L (ref 98–107)
CO2 SERPL-SCNC: 27 MMOL/L (ref 21–32)
CREAT SERPL-MCNC: 0.72 MG/DL (ref 0.5–1.05)
EGFRCR SERPLBLD CKD-EPI 2021: 83 ML/MIN/1.73M*2
EOSINOPHIL # BLD AUTO: 0.15 X10*3/UL (ref 0–0.4)
EOSINOPHIL NFR BLD AUTO: 2.2 %
ERYTHROCYTE [DISTWIDTH] IN BLOOD BY AUTOMATED COUNT: 12.2 % (ref 11.5–14.5)
GLUCOSE SERPL-MCNC: 118 MG/DL (ref 74–99)
HCT VFR BLD AUTO: 40 % (ref 36–46)
HGB BLD-MCNC: 13.6 G/DL (ref 12–16)
IMM GRANULOCYTES # BLD AUTO: 0.01 X10*3/UL (ref 0–0.5)
IMM GRANULOCYTES NFR BLD AUTO: 0.1 % (ref 0–0.9)
LYMPHOCYTES # BLD AUTO: 1.99 X10*3/UL (ref 0.8–3)
LYMPHOCYTES NFR BLD AUTO: 29.8 %
MCH RBC QN AUTO: 29.2 PG (ref 26–34)
MCHC RBC AUTO-ENTMCNC: 34 G/DL (ref 32–36)
MCV RBC AUTO: 86 FL (ref 80–100)
MONOCYTES # BLD AUTO: 0.52 X10*3/UL (ref 0.05–0.8)
MONOCYTES NFR BLD AUTO: 7.8 %
NEUTROPHILS # BLD AUTO: 3.94 X10*3/UL (ref 1.6–5.5)
NEUTROPHILS NFR BLD AUTO: 59.2 %
NRBC BLD-RTO: 0 /100 WBCS (ref 0–0)
P AXIS: 68 DEGREES
P OFFSET: 157 MS
P ONSET: 98 MS
PLATELET # BLD AUTO: 272 X10*3/UL (ref 150–450)
POTASSIUM SERPL-SCNC: 4.1 MMOL/L (ref 3.5–5.3)
PR INTERVAL: 238 MS
PROT SERPL-MCNC: 6.6 G/DL (ref 6.4–8.2)
Q ONSET: 217 MS
QRS COUNT: 9 BEATS
QRS DURATION: 80 MS
QT INTERVAL: 424 MS
QTC CALCULATION(BAZETT): 412 MS
QTC FREDERICIA: 416 MS
R AXIS: -49 DEGREES
RBC # BLD AUTO: 4.65 X10*6/UL (ref 4–5.2)
SODIUM SERPL-SCNC: 133 MMOL/L (ref 136–145)
T AXIS: 48 DEGREES
T OFFSET: 429 MS
VENTRICULAR RATE: 57 BPM
WBC # BLD AUTO: 6.7 X10*3/UL (ref 4.4–11.3)

## 2025-07-22 PROCEDURE — 96361 HYDRATE IV INFUSION ADD-ON: CPT

## 2025-07-22 PROCEDURE — 99285 EMERGENCY DEPT VISIT HI MDM: CPT | Mod: 25 | Performed by: EMERGENCY MEDICINE

## 2025-07-22 PROCEDURE — 36415 COLL VENOUS BLD VENIPUNCTURE: CPT | Performed by: EMERGENCY MEDICINE

## 2025-07-22 PROCEDURE — 71045 X-RAY EXAM CHEST 1 VIEW: CPT | Performed by: RADIOLOGY

## 2025-07-22 PROCEDURE — 2500000004 HC RX 250 GENERAL PHARMACY W/ HCPCS (ALT 636 FOR OP/ED): Performed by: EMERGENCY MEDICINE

## 2025-07-22 PROCEDURE — 93005 ELECTROCARDIOGRAM TRACING: CPT

## 2025-07-22 PROCEDURE — 84484 ASSAY OF TROPONIN QUANT: CPT | Performed by: EMERGENCY MEDICINE

## 2025-07-22 PROCEDURE — 96375 TX/PRO/DX INJ NEW DRUG ADDON: CPT

## 2025-07-22 PROCEDURE — 85025 COMPLETE CBC W/AUTO DIFF WBC: CPT | Performed by: EMERGENCY MEDICINE

## 2025-07-22 PROCEDURE — 96374 THER/PROPH/DIAG INJ IV PUSH: CPT

## 2025-07-22 PROCEDURE — 71045 X-RAY EXAM CHEST 1 VIEW: CPT

## 2025-07-22 PROCEDURE — 83880 ASSAY OF NATRIURETIC PEPTIDE: CPT | Performed by: EMERGENCY MEDICINE

## 2025-07-22 PROCEDURE — 80053 COMPREHEN METABOLIC PANEL: CPT | Performed by: EMERGENCY MEDICINE

## 2025-07-22 RX ORDER — ONDANSETRON HYDROCHLORIDE 2 MG/ML
4 INJECTION, SOLUTION INTRAVENOUS ONCE
Status: COMPLETED | OUTPATIENT
Start: 2025-07-22 | End: 2025-07-22

## 2025-07-22 RX ORDER — FAMOTIDINE 10 MG/ML
20 INJECTION, SOLUTION INTRAVENOUS ONCE
Status: COMPLETED | OUTPATIENT
Start: 2025-07-22 | End: 2025-07-22

## 2025-07-22 RX ORDER — TALC
3 POWDER (GRAM) TOPICAL NIGHTLY
COMMUNITY
End: 2025-07-25 | Stop reason: HOSPADM

## 2025-07-22 RX ORDER — MORPHINE SULFATE 4 MG/ML
4 INJECTION, SOLUTION INTRAMUSCULAR; INTRAVENOUS ONCE
Status: DISCONTINUED | OUTPATIENT
Start: 2025-07-22 | End: 2025-07-22 | Stop reason: HOSPADM

## 2025-07-22 RX ORDER — METFORMIN HYDROCHLORIDE 500 MG/1
500 TABLET ORAL
COMMUNITY

## 2025-07-22 RX ADMIN — ONDANSETRON 4 MG: 2 INJECTION, SOLUTION INTRAMUSCULAR; INTRAVENOUS at 09:35

## 2025-07-22 RX ADMIN — FAMOTIDINE 20 MG: 10 INJECTION, SOLUTION INTRAVENOUS at 09:35

## 2025-07-22 RX ADMIN — SODIUM CHLORIDE 500 ML: 900 INJECTION, SOLUTION INTRAVENOUS at 09:35

## 2025-07-22 ASSESSMENT — LIFESTYLE VARIABLES
HAVE YOU EVER FELT YOU SHOULD CUT DOWN ON YOUR DRINKING: NO
TOTAL SCORE: 0
EVER HAD A DRINK FIRST THING IN THE MORNING TO STEADY YOUR NERVES TO GET RID OF A HANGOVER: NO
EVER FELT BAD OR GUILTY ABOUT YOUR DRINKING: NO
HAVE PEOPLE ANNOYED YOU BY CRITICIZING YOUR DRINKING: NO

## 2025-07-22 ASSESSMENT — HEART SCORE
AGE: 65+
TROPONIN: LESS THAN OR EQUAL TO NORMAL LIMIT
RISK FACTORS: 1-2 RISK FACTORS
ECG: NORMAL
HISTORY: SLIGHTLY SUSPICIOUS
HEART SCORE: 3

## 2025-07-22 ASSESSMENT — PAIN - FUNCTIONAL ASSESSMENT: PAIN_FUNCTIONAL_ASSESSMENT: 0-10

## 2025-07-22 ASSESSMENT — PAIN SCALES - GENERAL: PAINLEVEL_OUTOF10: 0 - NO PAIN

## 2025-07-22 NOTE — PROGRESS NOTES
Attestation/Supervisory note for DOMINIQUE Abraham      The patient is an 85-year-old female presenting to the emergency department for evaluation of substernal chest pain.  The patient resides in a long-term care facility due to dementia.  The patient reportedly told the nursing staff that she had substernal chest pain earlier in the morning.  She reportedly could not provide any further details regarding her symptoms at that time.  The patient currently states that she does not have any symptoms and does not recall telling the nurses that she had any symptoms.  She denies any headache or visual changes.  No chest pain or shortness of breath.  No abdominal pain.  No nausea or vomiting.  No diarrhea or constipation.  No urinary complaints.  No fever or chills.  No cough or congestion.  No focal weakness or numbness.  All pertinent positives and negatives are recorded above.  All other systems reviewed and otherwise negative.  Vital signs with hypertension and borderline bradycardia but otherwise within normal limits.  Physical exam with a well-nourished well-developed female in no acute distress.  HEENT exam with dry mucous membranes but otherwise unremarkable.  She does not have any evidence of airway compromise or respiratory distress on exam.  Abdominal exam is benign.  She does not have any gross motor, neurologic or vascular deficits on exam.  Pulses are equal bilaterally.      EKG with sinus bradycardia at 57 bpm, first-degree block, leftward axis, low voltage, normal ST segment, normal T waves      Aspirin held due to reported allergy.  IV Pepcid, IV morphine, IV Zofran and IV fluids ordered.      Diagnostic labs with mild electrolyte imbalance but otherwise unremarkable.      Initial troponin < 3.  Repeat troponin 3      Heart score 3      XR chest 1 view   Final Result   Slight S-shaped dorsal spine scoliosis with mild-to-moderate DJD in   the thoracic spine as described.        DJD in both AC joints and in the  left glenohumeral joint as   described. Stable appearance of previous right shoulder arthroplasty,   only partially imaged.        Mild cardiomegaly.  Currently without radiographic evidence of CHF or   pneumonia.        MACRO:   None        Signed by: Alexis Moore 7/22/2025 9:58 AM   Dictation workstation:   [id number]           The patient does not have any evidence of airway compromise or respiratory distress on exam.  She does not have any evidence of hemodynamic instability.  She is well-perfused on exam.  No evidence of sepsis.  No evidence of a STEMI on EKG or cardiac enzymes.  No events on telemetry.  Chest x-ray without acute process.  She does have some mild cardiomegaly but no evidence of CHF or pneumonia.  No widening of the mediastinum.  Her pulses were equal bilaterally.      The patient did not have any recurrence of her symptoms while in the emergency room.      The patient was released in good condition.  She will follow-up with her primary care physician within 1 to 2 days for further management of her current symptoms and repeat check of her blood pressure.  She was also given a referral to cardiology.  She will return to the emergency department sooner with worsening of symptoms or onset of new symptoms      Impression/diagnosis:  Chest pain, substernal  Hypertension, unspecified      I personally saw the patient and made/approve the management plan and take responsibility for the patient management.      I independently interpreted the following study (S) EKG and diagnostic labs      I personally discussed the patient's management with the patient      I reviewed the results of the diagnostic labs and diagnostic imaging.  Formal radiology read was completed by the radiologist.      Wilma Peralta MD

## 2025-07-22 NOTE — PROGRESS NOTES
Pharmacy Medication History Review    Liv Saucedo is a 85 y.o. female. Pharmacy reviewed the patient's [id number] medications and allergies for accuracy.    Medications ADDED:  none  Medications CHANGED:  Metformin 500mg  Venlafaxine 37.5mg  Lisinopril 5mg  Memantine 10mg   Medications REMOVED:   Loperamide 2mg   Milk of magnesia      The list below reflects the updated PTA list. Comments regarding how patient may be taking medications differently can be found in the Admit Orders Activity  Prior to Admission Medications   Prescriptions Last Dose Informant   QUEtiapine (SeroqueL) 25 mg tablet Unknown Other   Sig: Take 1 tablet (25 mg) by mouth once daily.   amLODIPine (Norvasc) 5 mg tablet Unknown Other   Sig: TAKE 1 TABLET BY MOUTH ONCE  DAILY   atorvastatin (Lipitor) 10 mg tablet Unknown Other   Sig: Take 1 tablet (10 mg) by mouth once daily.   calcium carbonate/vitamin D3 (CALCIUM 600 WITH VITAMIN D3 ORAL) Unknown Other   Sig: Take by mouth.   cholecalciferol (Vitamin D-3) 25 MCG (1000 UT) capsule Unknown Other   Sig: Take 1 capsule (25 mcg) by mouth once daily.   cranberry fruit (cranberry) 450 mg tablet Unknown Other   Sig: Take 1 tablet by mouth once daily.   d-mannose 500 mg capsule Unknown Other   Sig: Take 1 capsule by mouth once daily.   donepezil (Aricept) 10 mg tablet Unknown Other   Sig: TAKE 1 TABLET BY MOUTH ONCE  DAILY   lisinopril 5 mg tablet Unknown Other   Sig: TAKE 1 TABLET BY MOUTH ONCE  DAILY   Patient taking differently: Take 2 tablets (10 mg) by mouth once daily.   melatonin 3 mg tablet Unknown Other   Sig: Take 1 tablet (3 mg) by mouth once daily at bedtime.   memantine (Namenda) 10 mg tablet Unknown Other   Sig: Take 1 tablet (10 mg) by mouth 2 times a day.   Patient taking differently: Take 1 tablet (10 mg) by mouth once daily.   metFORMIN (Glucophage) 500 mg tablet Unknown Other   Sig: Take 1 tablet (500 mg) by mouth once daily with breakfast.   metFORMIN  mg 24 hr  "tablet Not Taking Other   Sig: Take 1 tablet (500 mg) by mouth once daily in the evening. Take with meals.   Patient not taking: Reported on 7/22/2025   mirtazapine (Remeron) 7.5 mg tablet Unknown Other   Sig: TAKE 1 TABLET BY MOUTH AT  BEDTIME   venlafaxine (Effexor) 37.5 mg tablet Unknown Other   Sig: Take 25 mg by mouth once daily.      Facility-Administered Medications: None        The list below reflects the updated allergy list. Please review each documented allergy for additional clarification and justification.  Allergies  Reviewed by Savannah Mac CPhT on 7/22/2025        Severity Reactions Comments    Lorazepam Medium Hallucinations     Naproxen Not Specified Unknown     Nsaids (non-steroidal Anti-inflammatory Drug) Not Specified Other \"hyponatremia    Sulfa (sulfonamide Antibiotics) Not Specified Other mental status change    Tetracyclines Low Other, Unknown Nausea             Pharmacy has been updated to [facility].    Sources used to complete the med history include facility medication list. [facility] - [phone]    Below are additional concerns with the patient's PTA list.  This facility does not provide last doses given on the medication list they provide to the ER.    Savannah Mac CPhT-Adv  Please reach out via [employer] Secure Chat for questions    "

## 2025-07-22 NOTE — ED TRIAGE NOTES
"Arrived to ER via ems from [facility] for c/o CP/Rt arm tingling./body aches  this morning. Pt denies pain currently. Alert x1. HX dementia. \" I feel fine\" per pt.   "

## 2025-07-22 NOTE — DISCHARGE INSTRUCTIONS
"Thank you for allowing us to take care of you today. While you are home, you might receive a survey about your care in our hospital. Your nurse and myself, Kyrie Abraham PA-C, would love your honest feedback on how we can improve your care. Your feedback and especially your positive comments help our hospital receive the support we need to continue to serve you and your family. Thank you again for trusting us with your care.\"    Be sure to follow up as directed in 1-2 days.  All of the details of your follow up instructions are detailed in the follow up section of this packet.         It is important to remember that your care does not end here and you must continue to monitor your condition closely. Please return to the emergency department for any worsening or concerning signs or symptoms as directed by our conversations and the discharge instructions. Otherwise please follow up with your doctor in 2 days if no better or worse. If you do not have a doctor please contact the referral number on your discharge instructions. Please contact any physician specialists provided in your discharge notes as it is very important to follow up with them regarding your condition. If you are unable to reach the physicians provided, please come back to the Emergency Department at any time.        Return to emergency room without delay for ANY new or worsening pains or for any other symptoms or concerns.      "

## 2025-07-22 NOTE — ED PROVIDER NOTES
HPI   Chief Complaint   Patient presents with    Chest Pain       HPI  85-year-old female coming into the emergency department by EMS for evaluation of possible chest pain, the patient reportedly resides at a nursing facility, it was reported this morning that she had chest pain however resolved.  Patient has history of dementia, when asked patient says that she did not have chest pain, says she currently does not have chest pain, and says she currently feels fine with no complaints.      Patient History   Medical History[1]  Surgical History[2]  Family History[3]  Social History[4]    Physical Exam   ED Triage Vitals   Temp Pulse Resp BP   -- -- -- --      SpO2 Temp src Heart Rate Source Patient Position   -- -- -- --      BP Location FiO2 (%)     -- --       Physical Exam  PHYSICAL EXAMINATION    GENERAL APPEARANCE: Awake and alert.     VITAL SIGNS: As per the nurses' triage record.     HEENT: Normocephalic, atraumatic. Extraocular muscles are intact. Pupils equal round and reactive to light. Conjunctiva are pink. Negative scleral icterus. Mucous membranes are moist. Tongue in the midline. Pharynx was without erythema or exudates, uvula midline    NECK: Soft Nontender and supple, full gross ROM, no meningeal signs.    CHEST: Nontender to palpation. Clear to auscultation bilaterally. No rales, rhonchi, or wheezing.     HEART: S1, S2. Regular rate and rhythm. No murmurs, gallops or rubs.  Strong and equal pulses in the extremities.     ABDOMEN: Soft, nontender, nondistended, positive bowel sounds, no palpable masses.    MUSCULOSKELETAL: The calves are nontender to palpation. Full gross active range of motion. Ambulating on own with no acute difficulties     NEUROLOGICAL: Awake, alert. Power intact in the upper and lower extremities. Sensation is intact to light touch in the upper and lower extremities.     IMMUNOLOGICAL: No lymphatic streaking noted     DERM: No petechiae, rashes, or ecchymoses.    ED Course & MDM    Diagnoses as of 07/22/25 1229   Chest pain, unspecified type   Hypertension, unspecified type                 No data recorded     [place] Coma Scale Score: 14 (07/22/25 0915 : Joyce Goldberg RN) HEART Score: 3 (07/22/25 1229 : Kyrie Abraham PA-C)                         Medical Decision Making  Parts of this chart have been completed using voice recognition software. Please excuse any errors of transcription.  My thought process and reason for plan has been formulated from the time that I saw the patient until the time of disposition and is not specific to one specific moment during their visit and furthermore my MDM encompasses this entire chart and not only this text box.      HPI: Detailed above.    Exam: A medically appropriate exam performed, outlined above, given the known history and presentation.    History Limited by: Nothing    History obtained from: Patient    External/internal records reviewed: No external record reviewed    Social Determinants of Health considered during this visit: Lives at home    Chronic conditions impacting care: Dementia    Medications given during visit:  Medications   morphine injection 4 mg (4 mg intravenous Not Given 7/22/25 0938)   sodium chloride 0.9 % bolus 500 mL (0 mL intravenous Stopped 7/22/25 1137)   famotidine PF (Pepcid) injection 20 mg (20 mg intravenous Given 7/22/25 0935)   ondansetron (Zofran) injection 4 mg (4 mg intravenous Given 7/22/25 0935)        Diagnostic/tests  Labs Reviewed   COMPREHENSIVE METABOLIC PANEL - Abnormal       Result Value    Glucose 118 (*)     Sodium 133 (*)     Potassium 4.1      Chloride 99      Bicarbonate 27      Anion Gap 11      Urea Nitrogen 13      Creatinine 0.72      eGFR 83      Calcium 9.4      Albumin 4.6      Alkaline Phosphatase 65      Total Protein 6.6      AST 17      Bilirubin, Total 0.7      ALT 15     B-TYPE NATRIURETIC PEPTIDE - Normal    BNP 50      Narrative:        <100 pg/mL - Heart failure  unlikely  100-299 pg/mL - Intermediate probability of acute heart                  failure exacerbation. Correlate with clinical                  context and patient history.    >=300 pg/mL - Heart Failure likely. Correlate with clinical                  context and patient history.    BNP testing is performed using different testing methodology at [facility] than at other [facility]. Direct result comparisons should only be made within the same method.      SERIAL TROPONIN-INITIAL - Normal    Troponin I, High Sensitivity <3      Narrative:     Less than 99th percentile of normal range cutoff-  Female and children under 18 years old <14 ng/L; Male <21 ng/L: Negative  Repeat testing should be performed if clinically indicated.     Female and children under 18 years old 14-50 ng/L; Male 21-50 ng/L:  Consistent with possible cardiac damage and possible increased clinical   risk. Serial measurements may help to assess extent of myocardial damage.     >50 ng/L: Consistent with cardiac damage, increased clinical risk and  myocardial infarction. Serial measurements may help assess extent of   myocardial damage.      NOTE: Children less than 1 year old may have higher baseline troponin   levels and results should be interpreted in conjunction with the overall   clinical context.     NOTE: Troponin I testing is performed using a different   testing methodology at [facility] than at other   [facility]. Direct result comparisons should only   be made within the same method.   SERIAL TROPONIN, 1 HOUR - Normal    Troponin I, High Sensitivity 3      Narrative:     Less than 99th percentile of normal range cutoff-  Female and children under 18 years old <14 ng/L; Male <21 ng/L: Negative  Repeat testing should be performed if clinically indicated.     Female and children under 18 years old 14-50 ng/L; Male 21-50 ng/L:  Consistent with possible cardiac damage and possible increased clinical    risk. Serial measurements may help to assess extent of myocardial damage.     >50 ng/L: Consistent with cardiac damage, increased clinical risk and  myocardial infarction. Serial measurements may help assess extent of   myocardial damage.      NOTE: Children less than 1 year old may have higher baseline troponin   levels and results should be interpreted in conjunction with the overall   clinical context.     NOTE: Troponin I testing is performed using a different   testing methodology at [facility] than at other   [facility]. Direct result comparisons should only   be made within the same method.   CBC WITH AUTO DIFFERENTIAL    WBC 6.7      nRBC 0.0      RBC 4.65      Hemoglobin 13.6      Hematocrit 40.0      MCV 86      MCH 29.2      MCHC 34.0      RDW 12.2      Platelets 272      Neutrophils % 59.2      Immature Granulocytes %, Automated 0.1      Lymphocytes % 29.8      Monocytes % 7.8      Eosinophils % 2.2      Basophils % 0.9      Neutrophils Absolute 3.94      Immature Granulocytes Absolute, Automated 0.01      Lymphocytes Absolute 1.99      Monocytes Absolute 0.52      Eosinophils Absolute 0.15      Basophils Absolute 0.06     TROPONIN SERIES- (INITIAL, 1 HR)    Narrative:     The following orders were created for panel order Troponin I Series, High Sensitivity (0, 1 HR).  Procedure                               Abnormality         Status                     ---------                               -----------         ------                     Troponin I, High Sensiti...[[id number]]  Normal              Final result               Troponin, High Sensitivi...[[id number]]  Normal              Final result                 Please view results for these tests on the individual orders.      XR chest 1 view   Final Result   Slight S-shaped dorsal spine scoliosis with mild-to-moderate DJD in   the thoracic spine as described.        DJD in both AC joints and in the left glenohumeral joint as    described. Stable appearance of previous right shoulder arthroplasty,   only partially imaged.        Mild cardiomegaly.  Currently without radiographic evidence of CHF or   pneumonia.        MACRO:   None        Signed by: Alexis Moore 7/22/2025 9:58 AM   Dictation workstation:   [id number]           EKG: Obtained, read by attending physician reviewed by myself and per my interpretation no sign of STEMI criteria      Case discussed with: ED attending physician        Considerations/further MDM:  I estimate there is low risk for acute coronary syndrome including MI, intracranial hemorrhage, cardiac dysrhythmia, hypertension, tamponade, pneumothorax, hemothorax, pulmonary embolism, sepsis, intracranial hemorrhage, dissection, pneumonia, respiratory distress or compromise, pericardial effusion,  thus I consider the discharge disposition reasonable. We have discussed the diagnosis and risks, and we agree with discharging home to follow-up with their primary doctor or specialist as discussed and as provided.  We also discussed returning to the Emergency Department immediately if new or worsening symptoms occur. We have discussed the symptoms which are most concerning (e.g., bloody sputum, fever, worsening pain or shortness of breath, vomiting, weakness) that necessitate immediate return.    Patient has been seen in conjunction with attending physician Dr. Peralta        Procedure  Procedures       Kyrie Abraham PA-C  07/22/25 1229         [1]   Past Medical History:  Diagnosis Date    Dementia     GERD (gastroesophageal reflux disease)     High cholesterol     Hypertension     Pre-diabetes     Right shoulder pain    [2]   Past Surgical History:  Procedure Laterality Date    CT ABDOMEN ANGIOGRAM W AND/OR WO IV CONTRAST  6/19/2013    CT ABDOMEN ANGIOGRAM W AND/OR WO IV CONTRAST [facility]    TOTAL SHOULDER ARTHROPLASTY  07/15/2016    Shoulder Arthroplasty Total Shoulder Replacement   [3]   Family  History  Problem Relation Name Age of Onset    Breast cancer Mother          cause of death    Multiple sclerosis Mother      Other (bladder cancer) Father          cause of death    Liver disease Sister          End stage    Obesity Sister      Diabetes Sister      Hypothyroidism Sister      Hashimoto's thyroiditis Sister      Osteoporosis Sister      Dystonia Sister          Cervical    Other (Vascular dementia) Other          Father's second cousin    Other (lewy body dementia) Other          Father's side    Breast cancer Other          sister   [4]   Social History  Tobacco Use    Smoking status: Never    Smokeless tobacco: Never   Vaping Use    Vaping status: Never Used   Substance Use Topics    Alcohol use: Yes     Comment: rarely    Drug use: Never        Kyrie Abraham PA-C  07/22/25 1222

## 2025-07-22 NOTE — ED NOTES
Assumed care of pt. Pt has a hx of dementia. Bed alarm is on due to high risk of falls. Pt was ambulated to bathroom, one assist. VSS.      Nhung Baez RN  07/22/25 8017

## 2025-07-24 ENCOUNTER — APPOINTMENT (OUTPATIENT)
Dept: CARDIOLOGY | Facility: HOSPITAL | Age: 85
DRG: 312 | End: 2025-07-24
Payer: MEDICARE

## 2025-07-24 ENCOUNTER — APPOINTMENT (OUTPATIENT)
Dept: RADIOLOGY | Facility: HOSPITAL | Age: 85
DRG: 312 | End: 2025-07-24
Payer: MEDICARE

## 2025-07-24 ENCOUNTER — HOSPITAL ENCOUNTER (INPATIENT)
Facility: HOSPITAL | Age: 85
LOS: 1 days | Discharge: HOME HEALTH | DRG: 312 | End: 2025-07-25
Attending: EMERGENCY MEDICINE | Admitting: INTERNAL MEDICINE
Payer: MEDICARE

## 2025-07-24 DIAGNOSIS — R29.810 FACIAL DROOP: ICD-10-CM

## 2025-07-24 DIAGNOSIS — E55.9 VITAMIN D DEFICIENCY: ICD-10-CM

## 2025-07-24 DIAGNOSIS — G45.8 OTHER TRANSIENT CEREBRAL ISCHEMIC ATTACKS AND RELATED SYNDROMES: ICD-10-CM

## 2025-07-24 DIAGNOSIS — R55 SYNCOPE, UNSPECIFIED SYNCOPE TYPE: Primary | ICD-10-CM

## 2025-07-24 DIAGNOSIS — G45.9 TIA (TRANSIENT ISCHEMIC ATTACK): ICD-10-CM

## 2025-07-24 LAB
ALBUMIN SERPL BCP-MCNC: 4.5 G/DL (ref 3.4–5)
ALP SERPL-CCNC: 64 U/L (ref 33–136)
ALT SERPL W P-5'-P-CCNC: 14 U/L (ref 7–45)
ANION GAP SERPL CALCULATED.3IONS-SCNC: 11 MMOL/L (ref 10–20)
AORTIC VALVE MEAN GRADIENT: 2 MMHG
AORTIC VALVE PEAK VELOCITY: 1.04 M/S
APPEARANCE UR: CLEAR
APTT PPP: 25.7 SECONDS (ref 22–32.5)
AST SERPL W P-5'-P-CCNC: 15 U/L (ref 9–39)
AV PEAK GRADIENT: 4 MMHG
AVA (PEAK VEL): 2.16 CM2
AVA (VTI): 2.49 CM2
BASOPHILS # BLD AUTO: 0.07 X10*3/UL (ref 0–0.1)
BASOPHILS NFR BLD AUTO: 1 %
BILIRUB SERPL-MCNC: 0.5 MG/DL (ref 0–1.2)
BILIRUB UR STRIP.AUTO-MCNC: NEGATIVE MG/DL
BUN SERPL-MCNC: 11 MG/DL (ref 6–23)
CALCIUM SERPL-MCNC: 9.4 MG/DL (ref 8.6–10.3)
CARDIAC TROPONIN I PNL SERPL HS: 3 NG/L (ref 0–13)
CARDIAC TROPONIN I PNL SERPL HS: <3 NG/L (ref 0–13)
CHLORIDE SERPL-SCNC: 97 MMOL/L (ref 98–107)
CHOLEST SERPL-MCNC: 152 MG/DL (ref 0–199)
CHOLEST/HDLC SERPL: 2.2 {RATIO}
CO2 SERPL-SCNC: 28 MMOL/L (ref 21–32)
COLOR UR: COLORLESS
CREAT SERPL-MCNC: 0.85 MG/DL (ref 0.5–1.05)
EGFRCR SERPLBLD CKD-EPI 2021: 68 ML/MIN/1.73M*2
EJECTION FRACTION APICAL 4 CHAMBER: 62
EJECTION FRACTION: 68 %
EOSINOPHIL # BLD AUTO: 0.17 X10*3/UL (ref 0–0.4)
EOSINOPHIL NFR BLD AUTO: 2.4 %
ERYTHROCYTE [DISTWIDTH] IN BLOOD BY AUTOMATED COUNT: 12.3 % (ref 11.5–14.5)
EST. AVERAGE GLUCOSE BLD GHB EST-MCNC: 137 MG/DL
GLUCOSE BLD MANUAL STRIP-MCNC: 159 MG/DL (ref 74–99)
GLUCOSE BLD MANUAL STRIP-MCNC: 199 MG/DL (ref 74–99)
GLUCOSE SERPL-MCNC: 73 MG/DL (ref 74–99)
GLUCOSE UR STRIP.AUTO-MCNC: NORMAL MG/DL
HBA1C MFR BLD: 6.4 % (ref ?–5.7)
HCT VFR BLD AUTO: 37.4 % (ref 36–46)
HDLC SERPL-MCNC: 70.6 MG/DL
HGB BLD-MCNC: 13 G/DL (ref 12–16)
IMM GRANULOCYTES # BLD AUTO: 0.01 X10*3/UL (ref 0–0.5)
IMM GRANULOCYTES NFR BLD AUTO: 0.1 % (ref 0–0.9)
INR PPP: 1 (ref 0.9–1.2)
KETONES UR STRIP.AUTO-MCNC: NEGATIVE MG/DL
LDLC SERPL CALC-MCNC: 63 MG/DL
LEFT VENTRICLE INTERNAL DIMENSION DIASTOLE: 3.99 CM (ref 3.5–6)
LEFT VENTRICULAR OUTFLOW TRACT DIAMETER: 1.97 CM
LEUKOCYTE ESTERASE UR QL STRIP.AUTO: NEGATIVE
LV EJECTION FRACTION BIPLANE: 59 %
LYMPHOCYTES # BLD AUTO: 1.62 X10*3/UL (ref 0.8–3)
LYMPHOCYTES NFR BLD AUTO: 22.9 %
MAGNESIUM SERPL-MCNC: 1.93 MG/DL (ref 1.6–2.4)
MCH RBC QN AUTO: 29.5 PG (ref 26–34)
MCHC RBC AUTO-ENTMCNC: 34.8 G/DL (ref 32–36)
MCV RBC AUTO: 85 FL (ref 80–100)
MITRAL VALVE E/A RATIO: 0.65
MONOCYTES # BLD AUTO: 0.6 X10*3/UL (ref 0.05–0.8)
MONOCYTES NFR BLD AUTO: 8.5 %
NEUTROPHILS # BLD AUTO: 4.61 X10*3/UL (ref 1.6–5.5)
NEUTROPHILS NFR BLD AUTO: 65.1 %
NITRITE UR QL STRIP.AUTO: NEGATIVE
NON HDL CHOLESTEROL: 81 MG/DL (ref 0–149)
NRBC BLD-RTO: 0 /100 WBCS (ref 0–0)
PH UR STRIP.AUTO: 7 [PH]
PLATELET # BLD AUTO: 259 X10*3/UL (ref 150–450)
POTASSIUM SERPL-SCNC: 3.6 MMOL/L (ref 3.5–5.3)
PROT SERPL-MCNC: 6.7 G/DL (ref 6.4–8.2)
PROT UR STRIP.AUTO-MCNC: NEGATIVE MG/DL
PROTHROMBIN TIME: 10.4 SECONDS (ref 9.3–12.7)
RBC # BLD AUTO: 4.41 X10*6/UL (ref 4–5.2)
RBC # UR STRIP.AUTO: NEGATIVE MG/DL
RIGHT VENTRICLE PEAK SYSTOLIC PRESSURE: 23 MMHG
SODIUM SERPL-SCNC: 132 MMOL/L (ref 136–145)
SP GR UR STRIP.AUTO: 1.01
TRIGL SERPL-MCNC: 93 MG/DL (ref 0–149)
UROBILINOGEN UR STRIP.AUTO-MCNC: NORMAL MG/DL
VLDL: 19 MG/DL (ref 0–40)
WBC # BLD AUTO: 7.1 X10*3/UL (ref 4.4–11.3)

## 2025-07-24 PROCEDURE — 85610 PROTHROMBIN TIME: CPT | Performed by: CLINICAL NURSE SPECIALIST

## 2025-07-24 PROCEDURE — 2500000005 HC RX 250 GENERAL PHARMACY W/O HCPCS: Performed by: INTERNAL MEDICINE

## 2025-07-24 PROCEDURE — 2550000001 HC RX 255 CONTRASTS: Performed by: EMERGENCY MEDICINE

## 2025-07-24 PROCEDURE — 99291 CRITICAL CARE FIRST HOUR: CPT | Performed by: CLINICAL NURSE SPECIALIST

## 2025-07-24 PROCEDURE — 80053 COMPREHEN METABOLIC PANEL: CPT | Performed by: CLINICAL NURSE SPECIALIST

## 2025-07-24 PROCEDURE — 70498 CT ANGIOGRAPHY NECK: CPT | Performed by: RADIOLOGY

## 2025-07-24 PROCEDURE — 70496 CT ANGIOGRAPHY HEAD: CPT | Performed by: RADIOLOGY

## 2025-07-24 PROCEDURE — G0427 INPT/ED TELECONSULT70: HCPCS | Performed by: STUDENT IN AN ORGANIZED HEALTH CARE EDUCATION/TRAINING PROGRAM

## 2025-07-24 PROCEDURE — 70498 CT ANGIOGRAPHY NECK: CPT

## 2025-07-24 PROCEDURE — 83735 ASSAY OF MAGNESIUM: CPT | Performed by: CLINICAL NURSE SPECIALIST

## 2025-07-24 PROCEDURE — 71046 X-RAY EXAM CHEST 2 VIEWS: CPT | Performed by: RADIOLOGY

## 2025-07-24 PROCEDURE — 2500000001 HC RX 250 WO HCPCS SELF ADMINISTERED DRUGS (ALT 637 FOR MEDICARE OP): Performed by: CLINICAL NURSE SPECIALIST

## 2025-07-24 PROCEDURE — 36415 COLL VENOUS BLD VENIPUNCTURE: CPT | Performed by: CLINICAL NURSE SPECIALIST

## 2025-07-24 PROCEDURE — 70551 MRI BRAIN STEM W/O DYE: CPT | Mod: REDUCED SERVICES | Performed by: RADIOLOGY

## 2025-07-24 PROCEDURE — 85730 THROMBOPLASTIN TIME PARTIAL: CPT | Performed by: CLINICAL NURSE SPECIALIST

## 2025-07-24 PROCEDURE — 71046 X-RAY EXAM CHEST 2 VIEWS: CPT

## 2025-07-24 PROCEDURE — 93306 TTE W/DOPPLER COMPLETE: CPT | Performed by: INTERNAL MEDICINE

## 2025-07-24 PROCEDURE — 83036 HEMOGLOBIN GLYCOSYLATED A1C: CPT | Mod: WESLAB | Performed by: INTERNAL MEDICINE

## 2025-07-24 PROCEDURE — 2500000004 HC RX 250 GENERAL PHARMACY W/ HCPCS (ALT 636 FOR OP/ED): Performed by: INTERNAL MEDICINE

## 2025-07-24 PROCEDURE — 84484 ASSAY OF TROPONIN QUANT: CPT | Performed by: CLINICAL NURSE SPECIALIST

## 2025-07-24 PROCEDURE — 2500000002 HC RX 250 W HCPCS SELF ADMINISTERED DRUGS (ALT 637 FOR MEDICARE OP, ALT 636 FOR OP/ED): Performed by: INTERNAL MEDICINE

## 2025-07-24 PROCEDURE — 99223 1ST HOSP IP/OBS HIGH 75: CPT | Performed by: INTERNAL MEDICINE

## 2025-07-24 PROCEDURE — 93306 TTE W/DOPPLER COMPLETE: CPT

## 2025-07-24 PROCEDURE — 85025 COMPLETE CBC W/AUTO DIFF WBC: CPT | Performed by: CLINICAL NURSE SPECIALIST

## 2025-07-24 PROCEDURE — 82947 ASSAY GLUCOSE BLOOD QUANT: CPT

## 2025-07-24 PROCEDURE — 93005 ELECTROCARDIOGRAM TRACING: CPT

## 2025-07-24 PROCEDURE — 80061 LIPID PANEL: CPT | Performed by: INTERNAL MEDICINE

## 2025-07-24 PROCEDURE — 94660 CPAP INITIATION&MGMT: CPT

## 2025-07-24 PROCEDURE — 81003 URINALYSIS AUTO W/O SCOPE: CPT | Performed by: INTERNAL MEDICINE

## 2025-07-24 PROCEDURE — 99223 1ST HOSP IP/OBS HIGH 75: CPT | Performed by: STUDENT IN AN ORGANIZED HEALTH CARE EDUCATION/TRAINING PROGRAM

## 2025-07-24 PROCEDURE — 70551 MRI BRAIN STEM W/O DYE: CPT | Mod: 52

## 2025-07-24 PROCEDURE — 94760 N-INVAS EAR/PLS OXIMETRY 1: CPT

## 2025-07-24 PROCEDURE — 2060000001 HC INTERMEDIATE ICU ROOM DAILY

## 2025-07-24 PROCEDURE — 2500000001 HC RX 250 WO HCPCS SELF ADMINISTERED DRUGS (ALT 637 FOR MEDICARE OP): Performed by: INTERNAL MEDICINE

## 2025-07-24 RX ORDER — ONDANSETRON HYDROCHLORIDE 2 MG/ML
4 INJECTION, SOLUTION INTRAVENOUS EVERY 6 HOURS PRN
Status: DISCONTINUED | OUTPATIENT
Start: 2025-07-24 | End: 2025-07-25 | Stop reason: HOSPADM

## 2025-07-24 RX ORDER — ATORVASTATIN CALCIUM 10 MG/1
10 TABLET, FILM COATED ORAL NIGHTLY
Status: DISCONTINUED | OUTPATIENT
Start: 2025-07-24 | End: 2025-07-25 | Stop reason: HOSPADM

## 2025-07-24 RX ORDER — MEMANTINE HYDROCHLORIDE 5 MG/1
10 TABLET ORAL DAILY
Status: DISCONTINUED | OUTPATIENT
Start: 2025-07-24 | End: 2025-07-25 | Stop reason: HOSPADM

## 2025-07-24 RX ORDER — NAPROXEN SODIUM 220 MG/1
81 TABLET, FILM COATED ORAL DAILY
Status: DISCONTINUED | OUTPATIENT
Start: 2025-07-24 | End: 2025-07-24

## 2025-07-24 RX ORDER — ASPIRIN 325 MG
325 TABLET ORAL ONCE
Status: COMPLETED | OUTPATIENT
Start: 2025-07-24 | End: 2025-07-24

## 2025-07-24 RX ORDER — VENLAFAXINE 37.5 MG/1
37.5 TABLET ORAL DAILY
Status: DISCONTINUED | OUTPATIENT
Start: 2025-07-24 | End: 2025-07-25 | Stop reason: HOSPADM

## 2025-07-24 RX ORDER — FAMOTIDINE 20 MG/1
20 TABLET, FILM COATED ORAL DAILY
Status: DISCONTINUED | OUTPATIENT
Start: 2025-07-24 | End: 2025-07-25 | Stop reason: HOSPADM

## 2025-07-24 RX ORDER — LABETALOL HYDROCHLORIDE 5 MG/ML
10 INJECTION, SOLUTION INTRAVENOUS EVERY 10 MIN PRN
Status: DISCONTINUED | OUTPATIENT
Start: 2025-07-24 | End: 2025-07-25 | Stop reason: HOSPADM

## 2025-07-24 RX ORDER — ENOXAPARIN SODIUM 100 MG/ML
40 INJECTION SUBCUTANEOUS EVERY 24 HOURS
Status: DISCONTINUED | OUTPATIENT
Start: 2025-07-24 | End: 2025-07-25 | Stop reason: HOSPADM

## 2025-07-24 RX ORDER — CLOPIDOGREL BISULFATE 300 MG/1
300 TABLET, FILM COATED ORAL ONCE
Status: COMPLETED | OUTPATIENT
Start: 2025-07-24 | End: 2025-07-24

## 2025-07-24 RX ORDER — ACETAMINOPHEN 160 MG/5ML
650 SOLUTION ORAL EVERY 4 HOURS PRN
Status: DISCONTINUED | OUTPATIENT
Start: 2025-07-24 | End: 2025-07-25 | Stop reason: HOSPADM

## 2025-07-24 RX ORDER — ASPIRIN 300 MG/1
300 SUPPOSITORY RECTAL DAILY
Status: DISCONTINUED | OUTPATIENT
Start: 2025-07-24 | End: 2025-07-24

## 2025-07-24 RX ORDER — NAPROXEN SODIUM 220 MG/1
81 TABLET, FILM COATED ORAL DAILY
Status: DISCONTINUED | OUTPATIENT
Start: 2025-07-25 | End: 2025-07-25 | Stop reason: HOSPADM

## 2025-07-24 RX ORDER — DONEPEZIL HYDROCHLORIDE 10 MG/1
10 TABLET, FILM COATED ORAL DAILY
Status: DISCONTINUED | OUTPATIENT
Start: 2025-07-24 | End: 2025-07-25 | Stop reason: HOSPADM

## 2025-07-24 RX ORDER — CHOLECALCIFEROL (VITAMIN D3) 25 MCG
25 TABLET ORAL DAILY
Status: DISCONTINUED | OUTPATIENT
Start: 2025-07-24 | End: 2025-07-25 | Stop reason: HOSPADM

## 2025-07-24 RX ORDER — ASPIRIN 81 MG/1
81 TABLET ORAL DAILY
Status: DISCONTINUED | OUTPATIENT
Start: 2025-07-24 | End: 2025-07-24

## 2025-07-24 RX ORDER — POLYETHYLENE GLYCOL 3350 17 G/17G
17 POWDER, FOR SOLUTION ORAL DAILY
Status: DISCONTINUED | OUTPATIENT
Start: 2025-07-24 | End: 2025-07-25 | Stop reason: HOSPADM

## 2025-07-24 RX ORDER — MIRTAZAPINE 7.5 MG/1
7.5 TABLET, FILM COATED ORAL NIGHTLY
Status: DISCONTINUED | OUTPATIENT
Start: 2025-07-24 | End: 2025-07-25 | Stop reason: HOSPADM

## 2025-07-24 RX ORDER — QUETIAPINE FUMARATE 25 MG/1
25 TABLET, FILM COATED ORAL DAILY
Status: DISCONTINUED | OUTPATIENT
Start: 2025-07-24 | End: 2025-07-25 | Stop reason: HOSPADM

## 2025-07-24 RX ORDER — ATORVASTATIN CALCIUM 40 MG/1
40 TABLET, FILM COATED ORAL NIGHTLY
Status: DISCONTINUED | OUTPATIENT
Start: 2025-07-24 | End: 2025-07-24

## 2025-07-24 RX ORDER — ACETAMINOPHEN 325 MG/1
650 TABLET ORAL EVERY 4 HOURS PRN
Status: DISCONTINUED | OUTPATIENT
Start: 2025-07-24 | End: 2025-07-25 | Stop reason: HOSPADM

## 2025-07-24 RX ORDER — FAMOTIDINE 10 MG/ML
20 INJECTION, SOLUTION INTRAVENOUS DAILY
Status: DISCONTINUED | OUTPATIENT
Start: 2025-07-24 | End: 2025-07-25 | Stop reason: HOSPADM

## 2025-07-24 RX ORDER — ACETAMINOPHEN 650 MG/1
650 SUPPOSITORY RECTAL EVERY 4 HOURS PRN
Status: DISCONTINUED | OUTPATIENT
Start: 2025-07-24 | End: 2025-07-25 | Stop reason: HOSPADM

## 2025-07-24 RX ADMIN — ENOXAPARIN SODIUM 40 MG: 100 INJECTION SUBCUTANEOUS at 18:39

## 2025-07-24 RX ADMIN — Medication 1 DOSE: at 23:20

## 2025-07-24 RX ADMIN — ATORVASTATIN CALCIUM 10 MG: 10 TABLET, FILM COATED ORAL at 20:03

## 2025-07-24 RX ADMIN — QUETIAPINE FUMARATE 25 MG: 25 TABLET ORAL at 18:40

## 2025-07-24 RX ADMIN — POLYETHYLENE GLYCOL 3350 17 G: 17 POWDER, FOR SOLUTION ORAL at 18:40

## 2025-07-24 RX ADMIN — MIRTAZAPINE 7.5 MG: 7.5 TABLET, FILM COATED ORAL at 20:03

## 2025-07-24 RX ADMIN — CLOPIDOGREL BISULFATE 300 MG: 300 TABLET, FILM COATED ORAL at 12:19

## 2025-07-24 RX ADMIN — MEMANTINE 10 MG: 5 TABLET ORAL at 18:40

## 2025-07-24 RX ADMIN — IOHEXOL 50 ML: 350 INJECTION, SOLUTION INTRAVENOUS at 11:29

## 2025-07-24 RX ADMIN — FAMOTIDINE 20 MG: 20 TABLET, FILM COATED ORAL at 18:40

## 2025-07-24 RX ADMIN — Medication 25 MCG: at 18:39

## 2025-07-24 RX ADMIN — DONEPEZIL HYDROCHLORIDE 10 MG: 10 TABLET, FILM COATED ORAL at 18:40

## 2025-07-24 RX ADMIN — ASPIRIN 325 MG ORAL TABLET 325 MG: 325 PILL ORAL at 12:19

## 2025-07-24 SDOH — HEALTH STABILITY: PHYSICAL HEALTH
HOW OFTEN DO YOU NEED TO HAVE SOMEONE HELP YOU WHEN YOU READ INSTRUCTIONS, PAMPHLETS, OR OTHER WRITTEN MATERIAL FROM YOUR DOCTOR OR PHARMACY?: OFTEN

## 2025-07-24 SDOH — ECONOMIC STABILITY: HOUSING INSECURITY: IN THE PAST 12 MONTHS, HOW MANY TIMES HAVE YOU MOVED WHERE YOU WERE LIVING?: 2

## 2025-07-24 SDOH — SOCIAL STABILITY: SOCIAL NETWORK
DO YOU BELONG TO ANY CLUBS OR ORGANIZATIONS SUCH AS CHURCH GROUPS, UNIONS, FRATERNAL OR ATHLETIC GROUPS, OR SCHOOL GROUPS?: NO

## 2025-07-24 SDOH — ECONOMIC STABILITY: INCOME INSECURITY: IN THE PAST 12 MONTHS HAS THE ELECTRIC, GAS, OIL, OR WATER COMPANY THREATENED TO SHUT OFF SERVICES IN YOUR HOME?: NO

## 2025-07-24 SDOH — SOCIAL STABILITY: SOCIAL INSECURITY: ARE YOU OR HAVE YOU BEEN THREATENED OR ABUSED PHYSICALLY, EMOTIONALLY, OR SEXUALLY BY ANYONE?: NO

## 2025-07-24 SDOH — ECONOMIC STABILITY: FOOD INSECURITY: WITHIN THE PAST 12 MONTHS, THE FOOD YOU BOUGHT JUST DIDN'T LAST AND YOU DIDN'T HAVE MONEY TO GET MORE.: NEVER TRUE

## 2025-07-24 SDOH — HEALTH STABILITY: PHYSICAL HEALTH: ON AVERAGE, HOW MANY MINUTES DO YOU ENGAGE IN EXERCISE AT THIS LEVEL?: 0 MIN

## 2025-07-24 SDOH — SOCIAL STABILITY: SOCIAL INSECURITY: ABUSE: ADULT

## 2025-07-24 SDOH — ECONOMIC STABILITY: HOUSING INSECURITY: AT ANY TIME IN THE PAST 12 MONTHS, WERE YOU HOMELESS OR LIVING IN A SHELTER (INCLUDING NOW)?: NO

## 2025-07-24 SDOH — ECONOMIC STABILITY: FOOD INSECURITY: HOW HARD IS IT FOR YOU TO PAY FOR THE VERY BASICS LIKE FOOD, HOUSING, MEDICAL CARE, AND HEATING?: NOT HARD AT ALL

## 2025-07-24 SDOH — HEALTH STABILITY: PHYSICAL HEALTH: ON AVERAGE, HOW MANY DAYS PER WEEK DO YOU ENGAGE IN MODERATE TO STRENUOUS EXERCISE (LIKE A BRISK WALK)?: 0 DAYS

## 2025-07-24 SDOH — SOCIAL STABILITY: SOCIAL INSECURITY
WITHIN THE LAST YEAR, HAVE YOU BEEN KICKED, HIT, SLAPPED, OR OTHERWISE PHYSICALLY HURT BY YOUR PARTNER OR EX-PARTNER?: NO

## 2025-07-24 SDOH — ECONOMIC STABILITY: FOOD INSECURITY: WITHIN THE PAST 12 MONTHS, YOU WORRIED THAT YOUR FOOD WOULD RUN OUT BEFORE YOU GOT THE MONEY TO BUY MORE.: NEVER TRUE

## 2025-07-24 SDOH — SOCIAL STABILITY: SOCIAL INSECURITY: WERE YOU ABLE TO COMPLETE ALL THE BEHAVIORAL HEALTH SCREENINGS?: YES

## 2025-07-24 SDOH — SOCIAL STABILITY: SOCIAL INSECURITY
WITHIN THE LAST YEAR, HAVE YOU BEEN RAPED OR FORCED TO HAVE ANY KIND OF SEXUAL ACTIVITY BY YOUR PARTNER OR EX-PARTNER?: NO

## 2025-07-24 SDOH — ECONOMIC STABILITY: HOUSING INSECURITY: IN THE LAST 12 MONTHS, WAS THERE A TIME WHEN YOU WERE NOT ABLE TO PAY THE MORTGAGE OR RENT ON TIME?: NO

## 2025-07-24 SDOH — SOCIAL STABILITY: SOCIAL NETWORK: IN A TYPICAL WEEK, HOW MANY TIMES DO YOU TALK ON THE PHONE WITH FAMILY, FRIENDS, OR NEIGHBORS?: NEVER

## 2025-07-24 SDOH — SOCIAL STABILITY: SOCIAL INSECURITY: HAS ANYONE EVER THREATENED TO HURT YOUR FAMILY OR YOUR PETS?: NO

## 2025-07-24 SDOH — SOCIAL STABILITY: SOCIAL NETWORK: HOW OFTEN DO YOU ATTEND CHURCH OR RELIGIOUS SERVICES?: 1 TO 4 TIMES PER YEAR

## 2025-07-24 SDOH — SOCIAL STABILITY: SOCIAL INSECURITY: WITHIN THE LAST YEAR, HAVE YOU BEEN AFRAID OF YOUR PARTNER OR EX-PARTNER?: NO

## 2025-07-24 SDOH — SOCIAL STABILITY: SOCIAL INSECURITY: WITHIN THE LAST YEAR, HAVE YOU BEEN HUMILIATED OR EMOTIONALLY ABUSED IN OTHER WAYS BY YOUR PARTNER OR EX-PARTNER?: NO

## 2025-07-24 SDOH — SOCIAL STABILITY: SOCIAL INSECURITY: ARE YOU MARRIED, WIDOWED, DIVORCED, SEPARATED, NEVER MARRIED, OR LIVING WITH A PARTNER?: MARRIED

## 2025-07-24 SDOH — SOCIAL STABILITY: SOCIAL INSECURITY: ARE THERE ANY APPARENT SIGNS OF INJURIES/BEHAVIORS THAT COULD BE RELATED TO ABUSE/NEGLECT?: NO

## 2025-07-24 SDOH — SOCIAL STABILITY: SOCIAL INSECURITY: HAVE YOU HAD THOUGHTS OF HARMING ANYONE ELSE?: NO

## 2025-07-24 SDOH — SOCIAL STABILITY: SOCIAL NETWORK: HOW OFTEN DO YOU ATTEND MEETINGS OF THE CLUBS OR ORGANIZATIONS YOU BELONG TO?: NEVER

## 2025-07-24 SDOH — HEALTH STABILITY: MENTAL HEALTH
DO YOU FEEL STRESS - TENSE, RESTLESS, NERVOUS, OR ANXIOUS, OR UNABLE TO SLEEP AT NIGHT BECAUSE YOUR MIND IS TROUBLED ALL THE TIME - THESE DAYS?: RATHER MUCH

## 2025-07-24 SDOH — SOCIAL STABILITY: SOCIAL INSECURITY: DO YOU FEEL UNSAFE GOING BACK TO THE PLACE WHERE YOU ARE LIVING?: NO

## 2025-07-24 SDOH — SOCIAL STABILITY: SOCIAL NETWORK: HOW OFTEN DO YOU GET TOGETHER WITH FRIENDS OR RELATIVES?: ONCE A WEEK

## 2025-07-24 SDOH — ECONOMIC STABILITY: TRANSPORTATION INSECURITY: IN THE PAST 12 MONTHS, HAS LACK OF TRANSPORTATION KEPT YOU FROM MEDICAL APPOINTMENTS OR FROM GETTING MEDICATIONS?: NO

## 2025-07-24 SDOH — SOCIAL STABILITY: SOCIAL INSECURITY: HAVE YOU HAD ANY THOUGHTS OF HARMING ANYONE ELSE?: NO

## 2025-07-24 SDOH — SOCIAL STABILITY: SOCIAL INSECURITY: DOES ANYONE TRY TO KEEP YOU FROM HAVING/CONTACTING OTHER FRIENDS OR DOING THINGS OUTSIDE YOUR HOME?: NO

## 2025-07-24 SDOH — SOCIAL STABILITY: SOCIAL INSECURITY: DO YOU FEEL ANYONE HAS EXPLOITED OR TAKEN ADVANTAGE OF YOU FINANCIALLY OR OF YOUR PERSONAL PROPERTY?: NO

## 2025-07-24 ASSESSMENT — COGNITIVE AND FUNCTIONAL STATUS - GENERAL
DAILY ACTIVITIY SCORE: 19
MOVING TO AND FROM BED TO CHAIR: A LITTLE
PERSONAL GROOMING: A LITTLE
TOILETING: A LITTLE
DRESSING REGULAR LOWER BODY CLOTHING: A LITTLE
MOVING TO AND FROM BED TO CHAIR: A LITTLE
DAILY ACTIVITIY SCORE: 19
DRESSING REGULAR UPPER BODY CLOTHING: A LITTLE
PATIENT BASELINE BEDBOUND: NO
CLIMB 3 TO 5 STEPS WITH RAILING: A LITTLE
HELP NEEDED FOR BATHING: A LITTLE
DRESSING REGULAR LOWER BODY CLOTHING: A LITTLE
WALKING IN HOSPITAL ROOM: A LITTLE
WALKING IN HOSPITAL ROOM: A LITTLE
TOILETING: A LITTLE
STANDING UP FROM CHAIR USING ARMS: A LITTLE
TURNING FROM BACK TO SIDE WHILE IN FLAT BAD: A LITTLE
CLIMB 3 TO 5 STEPS WITH RAILING: A LITTLE
MOBILITY SCORE: 19
HELP NEEDED FOR BATHING: A LITTLE
DRESSING REGULAR UPPER BODY CLOTHING: A LITTLE
STANDING UP FROM CHAIR USING ARMS: A LITTLE
PERSONAL GROOMING: A LITTLE
MOBILITY SCORE: 19
TURNING FROM BACK TO SIDE WHILE IN FLAT BAD: A LITTLE

## 2025-07-24 ASSESSMENT — LIFESTYLE VARIABLES
SKIP TO QUESTIONS 9-10: 1
EVER FELT BAD OR GUILTY ABOUT YOUR DRINKING: NO
HAVE YOU EVER FELT YOU SHOULD CUT DOWN ON YOUR DRINKING: NO
HOW OFTEN DO YOU HAVE A DRINK CONTAINING ALCOHOL: 2-4 TIMES A MONTH
EVER HAD A DRINK FIRST THING IN THE MORNING TO STEADY YOUR NERVES TO GET RID OF A HANGOVER: NO
HAVE PEOPLE ANNOYED YOU BY CRITICIZING YOUR DRINKING: NO
AUDIT-C TOTAL SCORE: 2
TOTAL SCORE: 0
SUBSTANCE_ABUSE_PAST_12_MONTHS: NO
HOW MANY STANDARD DRINKS CONTAINING ALCOHOL DO YOU HAVE ON A TYPICAL DAY: 1 OR 2
AUDIT-C TOTAL SCORE: 2
HOW OFTEN DO YOU HAVE 6 OR MORE DRINKS ON ONE OCCASION: NEVER
PRESCIPTION_ABUSE_PAST_12_MONTHS: NO

## 2025-07-24 ASSESSMENT — ACTIVITIES OF DAILY LIVING (ADL)
HEARING - RIGHT EAR: FUNCTIONAL
LACK_OF_TRANSPORTATION: NO
ASSISTIVE_DEVICE: EYEGLASSES;DENTURES PARTIAL
WALKS IN HOME: NEEDS ASSISTANCE
BATHING: NEEDS ASSISTANCE
ADEQUATE_TO_COMPLETE_ADL: YES
JUDGMENT_ADEQUATE_SAFELY_COMPLETE_DAILY_ACTIVITIES: NO
LACK_OF_TRANSPORTATION: NO
GROOMING: NEEDS ASSISTANCE
PATIENT'S MEMORY ADEQUATE TO SAFELY COMPLETE DAILY ACTIVITIES?: NO
FEEDING YOURSELF: INDEPENDENT
TOILETING: NEEDS ASSISTANCE
DRESSING YOURSELF: NEEDS ASSISTANCE
HEARING - LEFT EAR: FUNCTIONAL
LACK_OF_TRANSPORTATION: NO

## 2025-07-24 ASSESSMENT — PAIN - FUNCTIONAL ASSESSMENT
PAIN_FUNCTIONAL_ASSESSMENT: 0-10
PAIN_FUNCTIONAL_ASSESSMENT: 0-10

## 2025-07-24 ASSESSMENT — PAIN SCALES - GENERAL
PAINLEVEL_OUTOF10: 0 - NO PAIN
PAINLEVEL_OUTOF10: 0 - NO PAIN

## 2025-07-24 ASSESSMENT — PATIENT HEALTH QUESTIONNAIRE - PHQ9
1. LITTLE INTEREST OR PLEASURE IN DOING THINGS: NOT AT ALL
2. FEELING DOWN, DEPRESSED OR HOPELESS: SEVERAL DAYS
SUM OF ALL RESPONSES TO PHQ9 QUESTIONS 1 & 2: 1

## 2025-07-24 NOTE — CARE PLAN
The patient's goals for the shift include To get enough sleep    The clinical goals for the shift include FREE OF FALLS, REST    Over the shift, the patient did not make progress toward the following goals. Barriers to progression include CONFUSION. Recommendations to address these barriers include sitter, rest.

## 2025-07-24 NOTE — ED TRIAGE NOTES
From ozzy for a syncopal episode. VS stable. Pt was sitting down and did not fall to the floor. Gluc 144. Pt is a&ox2.

## 2025-07-24 NOTE — ED PROVIDER NOTES
Department of Emergency Medicine   ED  Provider Note  Admit Date/RoomTime: 7/24/2025  9:58 AM  ED Room: 15/15-A        History of Present Illness:  Chief Complaint   Patient presents with    Syncope         Liv Saucedo is a 85 y.o. female history of anxiety, dementia ANO x 2 at baseline per report, reflux, presented to the emergency department with complaints of reported syncope per EMS report patient is from [facility] for syncopal episode was sitting down and did not fall to the floor had a brief episode of syncope where she laid her head down.  Patient states she was very frustrated and does not know exactly what happened.  She denies any headache.  No itchy watery eyes runny nose sore throat no chest pain no abdominal pain no nausea no vomiting.  While talking with patient stated my mouth feels funny and noticed to have a right facial droop.  Patient states this has been ongoing for some time on and off is a twitching sensation.  Resolves very quickly after about 30 seconds.    Review of Systems: Limited secondary to patient's history of dementia  Pertinent positives and negatives are stated within HPI, all other systems reviewed and are negative.        --------------------------------------------- PAST HISTORY ---------------------------------------------  Past Medical History:  has a past medical history of Dementia, GERD (gastroesophageal reflux disease), High cholesterol, Hypertension, Pre-diabetes, and Right shoulder pain.  Past Surgical History:  has a past surgical history that includes Total shoulder arthroplasty (07/15/2016) and CT angio abdomen w and or wo IV IV contrast (6/19/2013).  Social History:  reports that she has never smoked. She has never used smokeless tobacco. She reports current alcohol use of about 1.0 - 2.0 standard drink of alcohol per week. She reports that she does not use drugs.  Family History: family history includes Breast cancer in her mother and another family member;  Diabetes in her sister; Dystonia in her sister; Hashimoto's thyroiditis in her sister; Hypothyroidism in her sister; Liver disease in her sister; Multiple sclerosis in her mother; Obesity in her sister; Osteoporosis in her sister; Vascular dementia in an other family member; bladder cancer in her father; lewy body dementia in an other family member.. Unless otherwise noted, family history is non contributory  The patient’s home medications have been reviewed.  Allergies: Lorazepam, Naproxen, Nsaids (non-steroidal anti-inflammatory drug), Sulfa (sulfonamide antibiotics), and Tetracyclines        ---------------------------------------------------PHYSICAL EXAM--------------------------------------    GENERAL APPEARANCE: Awake and alert.   VITAL SIGNS: As per the nurses' triage record.   HEENT: Normocephalic, atraumatic.  No raccoon eyes or bolivar signs noted.  No epistaxis noted.  No bite to the tongue or lip.  No contusions abrasions lacerations noted to the face or scalp.  Extraocular muscles are intact. Pupils equal round and reactive to light. Conjunctiva are pink. Negative scleral icterus. Mucous membranes are moist. Tongue in the midline. Pharynx was without erythema or exudates, uvula midline  NECK: Soft Nontender and supple, full gross ROM, no meningeal signs.  CHEST: Nontender to palpation. Clear to auscultation bilaterally. No rales, rhonchi, or wheezing.   HEART: S1, S2. Regular rate and rhythm. No murmurs, gallops or rubs.  Strong and equal pulses in the extremities.   ABDOMEN: Soft, nontender, nondistended, positive bowel sounds, no palpable masses.  MUSCULCSKELETAL: The calves are nontender to palpation. Full gross active range of motion.  Peripheral pulses intact.  Neurovascularly intact.  No peripheral edema   NEUROLOGICAL: Place name.  Was unable to tell me the month.  NIH of 3 secondary to decreased sensation in the right lower face right facial droop resolved after 30 seconds.. Power intact in the  "upper and lower extremities. Sensation is intact to light touch in the upper and lower extremities.  Follows simple commands.  Pushes and pulls are equal and strong.  Patient ambulates with no difficulty.  IMMUNOLOGICAL: No lymphatic streaking noted   DERM: No petechiae, rashes, or ecchymoses.          ------------------------- NURSING NOTES AND VITALS REVIEWED ---------------------------  The nursing notes within the ED encounter and vital signs as below have been reviewed by myself  /66 (BP Location: Right arm, Patient Position: Standing)   Pulse 72   Temp 35.9 °C (96.6 °F) (Temporal)   Resp 16   Ht 1.575 m (5' 2\")   Wt 62.7 kg (138 lb 3.7 oz)   SpO2 100%   BMI 25.28 kg/m²     Oxygen Saturation Interpretation: 99% room air    The cardiac monitor revealed sinus bradycardia with a heart rate in the 50s as interpreted by me. The cardiac monitor was ordered secondary to the patient's heart rate and to monitor the patient for dysrhythmia.       The patient’s available past medical records and past encounters were reviewed.          -----------------------DIAGNOSTIC RESULTS------------------------  LABS:    Labs Reviewed   COMPREHENSIVE METABOLIC PANEL - Abnormal       Result Value    Glucose 73 (*)     Sodium 132 (*)     Potassium 3.6      Chloride 97 (*)     Bicarbonate 28      Anion Gap 11      Urea Nitrogen 11      Creatinine 0.85      eGFR 68      Calcium 9.4      Albumin 4.5      Alkaline Phosphatase 64      Total Protein 6.7      AST 15      Bilirubin, Total 0.5      ALT 14     MAGNESIUM - Normal    Magnesium 1.93     SERIAL TROPONIN-INITIAL - Normal    Troponin I, High Sensitivity <3      Narrative:     Less than 99th percentile of normal range cutoff-  Female and children under 18 years old <14 ng/L; Male <21 ng/L: Negative  Repeat testing should be performed if clinically indicated.     Female and children under 18 years old 14-50 ng/L; Male 21-50 ng/L:  Consistent with possible cardiac damage and " possible increased clinical   risk. Serial measurements may help to assess extent of myocardial damage.     >50 ng/L: Consistent with cardiac damage, increased clinical risk and  myocardial infarction. Serial measurements may help assess extent of   myocardial damage.      NOTE: Children less than 1 year old may have higher baseline troponin   levels and results should be interpreted in conjunction with the overall   clinical context.     NOTE: Troponin I testing is performed using a different   testing methodology at [facility] than at other   [facility]. Direct result comparisons should only   be made within the same method.   SERIAL TROPONIN, 1 HOUR - Normal    Troponin I, High Sensitivity 3      Narrative:     Less than 99th percentile of normal range cutoff-  Female and children under 18 years old <14 ng/L; Male <21 ng/L: Negative  Repeat testing should be performed if clinically indicated.     Female and children under 18 years old 14-50 ng/L; Male 21-50 ng/L:  Consistent with possible cardiac damage and possible increased clinical   risk. Serial measurements may help to assess extent of myocardial damage.     >50 ng/L: Consistent with cardiac damage, increased clinical risk and  myocardial infarction. Serial measurements may help assess extent of   myocardial damage.      NOTE: Children less than 1 year old may have higher baseline troponin   levels and results should be interpreted in conjunction with the overall   clinical context.     NOTE: Troponin I testing is performed using a different   testing methodology at [facility] than at other   [facility]. Direct result comparisons should only   be made within the same method.   PROTIME-INR - Normal    Protime 10.4      INR 1.0      Narrative:     INR Therapeutic Range: 2.0-3.5   APTT - Normal    aPTT 25.7     TROPONIN SERIES- (INITIAL, 1 HR)    Narrative:     The following orders were created for panel order Troponin I  Series, High Sensitivity (0, 1 HR).  Procedure                               Abnormality         Status                     ---------                               -----------         ------                     Troponin I, High Sensiti...[[id number]]  Normal              Final result               Troponin, High Sensitivi...[[id number]]  Normal              Final result                 Please view results for these tests on the individual orders.   CBC WITH AUTO DIFFERENTIAL    WBC 7.1      nRBC 0.0      RBC 4.41      Hemoglobin 13.0      Hematocrit 37.4      MCV 85      MCH 29.5      MCHC 34.8      RDW 12.3      Platelets 259      Neutrophils % 65.1      Immature Granulocytes %, Automated 0.1      Lymphocytes % 22.9      Monocytes % 8.5      Eosinophils % 2.4      Basophils % 1.0      Neutrophils Absolute 4.61      Immature Granulocytes Absolute, Automated 0.01      Lymphocytes Absolute 1.62      Monocytes Absolute 0.60      Eosinophils Absolute 0.17      Basophils Absolute 0.07     LIPID PANEL    Cholesterol 152      HDL-Cholesterol 70.6      Cholesterol/HDL Ratio 2.2      LDL Calculated 63      VLDL 19      Triglycerides 93      Non HDL Cholesterol 81     HEMOGLOBIN A1C   HEMOGLOBIN A1C   URINALYSIS WITH REFLEX CULTURE AND MICROSCOPIC    Narrative:     The following orders were created for panel order Urinalysis with Reflex Culture and Microscopic.  Procedure                               Abnormality         Status                     ---------                               -----------         ------                     Urinalysis with Reflex C...[[id number]]                                                 Extra Urine Gray Tube[[id number]]                                                         Please view results for these tests on the individual orders.   URINALYSIS WITH REFLEX CULTURE AND MICROSCOPIC   EXTRA URINE GRAY TUBE   POCT GLUCOSE METER   POCT GLUCOSE METER   POCT GLUCOSE METER   POCT GLUCOSE METER   POCT  GLUCOSE METER       As interpreted by me, the above displayed labs are abnormal. All other labs obtained during this visit were within normal range or not returned as of this dictation.      EKG Interpretation    1212 Twelve-lead EKG shows normal sinus rhythm 61 beats a minute.  First-degree AV block noted, other intervals normal.  No ST elevations or depressions. [ML]         Transthoracic Echo Complete   Final Result      CT angio head and neck w and wo IV contrast   Final Result   1. No acute intracranial abnormality. Progression of small-vessel   ischemic disease since 2018.   2. Patency of the cervical arteries.   3. Patency of the intracranial arteries without significant stenoses   or aneurysm formation.                  I personally reviewed the images/study and I agree with the findings   as stated by Venkata Mathias DO        MACRO:   None   None.        Signed by: Merlyn Vicente 7/24/2025 12:26 PM   Dictation workstation:   [id number]      XR chest 2 views   Final Result   No acute cardiopulmonary process.        MACRO:   None        Signed by: Ivory Aguilera 7/24/2025 11:38 AM   Dictation workstation:   [id number]      MR brain wo IV contrast    (Results Pending)           Transthoracic Echo Complete   Final Result      CT angio head and neck w and wo IV contrast   Final Result   1. No acute intracranial abnormality. Progression of small-vessel   ischemic disease since 2018.   2. Patency of the cervical arteries.   3. Patency of the intracranial arteries without significant stenoses   or aneurysm formation.                  I personally reviewed the images/study and I agree with the findings   as stated by Venkata Mathias DO        MACRO:   None   None.        Signed by: Merlyn Vicente 7/24/2025 12:26 PM   Dictation workstation:   [id number]      XR chest 2 views   Final Result   No acute cardiopulmonary process.        MACRO:   None        Signed by: Ivory Aguilera 7/24/2025 11:38 AM   Dictation workstation:    [id number]      MR brain wo IV contrast    (Results Pending)           ------------------------------ ED COURSE/MEDICAL DECISION MAKING----------------------  Medical Decision Making:   Exam: A medically appropriate exam performed, outlined above, given the known history and presentation.    History obtained from: Review of medical record nursing notes patient patient's  nursing home transfer form      Social Determinants of Health considered during this visit: Resides at [facility]      PAST MEDICAL HISTORY/Chronic Conditions Affecting Care     has a past medical history of Dementia, GERD (gastroesophageal reflux disease), High cholesterol, Hypertension, Pre-diabetes, and Right shoulder pain.       CC/HPI Summary, Social Determinants of health, Records Reviewed, DDx, testing done/not done, ED Course, Reassessment, disposition considerations/shared decision making with patient, consults, disposition:   Presents with reported syncopal episode while talking with patient had an episode of right-sided facial droop and decreased sensation of the right face  Plan  Patient reported she has been having this issue for some time where her face will come and go with the sensation.  Chest x-ray  CBC  CMP MS magnesium  Troponin  CT head and neck angio  EKG  APTT  PT/INR  Neurochecks  NIH stroke scale    Medical Decision Making/Differential Diagnosis:  Differentials include not limited to syncope versus electrolyte abnormality versus anemia versus dehydration versus stroke versus TIA versus intracranial bleed versus arrhythmia  Review  Glucose 73  Sodium 132, chloride 97 otherwise electrolytes unremarkable  Normal renal function  Normal LFTs  Magnesium within normal limits  Coag panel within normal limits  White blood cell count 7.1  Hemoglobin 13  Troponin less than 3 repeat troponin 3  Patient presented to the emergency department with complaints of syncope.  While evaluating the patient she had an episode where her  right side of her face drooped decreased sensation NIH was performed at 3.  Patient already had CT ordered secondary to syncope discussed case with the telestroke team who evaluated the patient CTs were reviewed which showed 1. No acute intracranial abnormality. Progression of small-vessel ischemic disease since 2018. 2. Patency of the cervical arteries.  3. Patency of the intracranial arteries without significant stenoses or aneurysm formation.  Symptoms resolved very quickly after 30 seconds.  Seen evaluated by the stroke neurology team recommend admission for TIA stroke workup.  Loading dose aspirin Plavix ordered.  Stroke protocol initiated.  Electrolytes and chloride are low otherwise electrolytes unremarkable.  Normal renal function.  Normal LFTs.  She is not anemic.  No elevation white blood cell count to indicate infection.  Coag panel within normal limits.  Glucose 73 troponin less than 3 BNP troponin 3 EKG showed normal sinus rhythm chest x-ray showed No acute cardiopulmonary process.   Based on patient's clinical presentation history and symptoms consistent with  Syncope  Facial droop concerning for TIA versus stroke requiring admission further evaluation and treatment  Patient and family amenable plan amenable to admission  Patient seen and evaluated with attending physician Dr. Lejeune   While in the emergency department patient had several episodes of confusion reassured reoriented remains neurologically at baseline does have history of dementia no further facial droop or decreased sensation noted.  PROCEDURES  Unless otherwise noted below, none      CONSULTS:   IP CONSULT TO NEURO TELESTROKE  IP CONSULT TO NEUROLOGY  IP CONSULT TO SOCIAL WORK  IP CONSULT TO NUTRITION SERVICES    Telestroke team Dr. Renteria  ED Course as of 07/24/25 1635   Thu Jul 24, 2025   1023 While talking with patient have an episode of right facial droop reported her face felt funny NIH performed 3 patient unable to tell me the  month right facial droop and decreased sensation of the right face lasted about 30 seconds [TB]   1035 Patient had a brief episode of witnessed right facial droop reported that her face felt funny lasting about 30 seconds.  Unsure if this is new or old for the patient stated that this has been ongoing she is unable to tell me for how long.  Brain attack was not initiated she is not a candidate for TNK or thrombectomy.  And symptoms totally resolved however we will order stroke workup in the emergency department discussed with attending physician [TB]   1125 Patient's  present in the emergency department.  Reports he has never noticed her having a facial droop or twitching in the face.  Discussed plan of care with him.  Reports she does have vascular dementia.  Not knowing the month is not abnormal for her.  But the facial droop is abnormal.  Because patient's family reported this is not a normal situation for patient with the facial droop and decreased sensation in the face a consult was placed to telestroke. [TB]   1137 Discussed case with stroke neurology team Dr. Renteria advised that she will see the patient for evaluation [TB]   1201 After evaluation by the stroke neurology team recommend admission TIA stroke workup started on loading dose aspirin and Plavix [TB]   1212 Twelve-lead EKG shows normal sinus rhythm 61 beats a minute.  First-degree AV block noted, other intervals normal.  No ST elevations or depressions. [ML]   1215 Discussed case with hospitalist Dr. Lee has agreed to admit patient for further evaluation and treatment stepdown TIA stroke workup [TB]      ED Course User Index  [ML] Marty R Lejeune, DO  [TB] ION Warren         Diagnoses as of 07/24/25 1635   Syncope, unspecified syncope type   Facial droop         This patient has remained hemodynamically stable during their ED course.      Critical Care: 31  Critical Care    Performed by: ION Warren  Authorized  by: Marty R Lejeune, DO    Critical care provider statement:     Critical care time (minutes):  31    Critical care time was exclusive of:  Separately billable procedures and treating other patients and teaching time    Critical care was necessary to treat or prevent imminent or life-threatening deterioration of the following conditions:  CNS failure or compromise    Critical care was time spent personally by me on the following activities:  Development of treatment plan with patient or surrogate, discussions with consultants, evaluation of patient's response to treatment, blood draw for specimens, examination of patient, interpretation of cardiac output measurements, obtaining history from patient or surrogate, ordering and performing treatments and interventions, ordering and review of laboratory studies, re-evaluation of patient's condition, pulse oximetry, ordering and review of radiographic studies and review of old charts    Care discussed with: admitting provider    Critical care time secondary to strokelike symptoms requiring advanced testing close monitoring consultation to specialty teams      Counseling:  The emergency provider has spoken with the patient  And discussed today’s results, in addition to providing specific details for the plan of care and counseling regarding the diagnosis and prognosis.  Questions are answered at this time and they are agreeable with the plan.         --------------------------------- IMPRESSION AND DISPOSITION ---------------------------------    IMPRESSION  1. Syncope, unspecified syncope type    2. Facial droop    3. TIA (transient ischemic attack)    4. Other transient cerebral ischemic attacks and related syndromes        DISPOSITION  Disposition: Admit hospitalist service  Patient condition is intermediate bed stable improved with ER interventions        NOTE: This report was transcribed using voice recognition software. Every effort was made to ensure accuracy;  however, inadvertent computerized transcription errors may be present      Alee Huddleston, HILOTN-CNP  07/24/25 1626

## 2025-07-24 NOTE — CONSULTS
"    Inpatient consult to Neurology  Consult performed by: Jeanette Ahumada MD  Consult ordered by: Bird Lee MD        General Neurology Consult Note    HPI  Liv Saucedo is a 85 y.o. female with PMH of HTN, HLD, vascular dementia reportedly, who presented to [facility] on 7/24/2025 with near syncope, neurology consulted for syncope.    Hx limited as patient unable to provide hx. Patient thinks she came to ED for her anxiety. She feels she is upset because she recently moved her facility (per  from assisted living to [place]). Per  facility told him that patient reported dizziness and that's why patient came to ED. Pt had similar dizziness 2 days ago. No associated HA, vision changes, weakness, numbness of any particular arm or leg. No falls. No SOB, palpitations though with anxiety pt would get these sx.     Per ED documentation there was an episode where pt had R facial droop reported as \"funny feeling on R face\" that spontaneously resolved during evaluation. No weakness noted.     BP was 142/76. Glucose low at 73.   CTA was done in the ED which was personally reviewed and was negative for any significant stenosis or occlusion. White matter changes noted.     Per  MRI brain is already planned.       Past Medical History  Medical History[1]  Surgical History  Surgical History[2]  Social History  Social History[3]  Allergies  Lorazepam, Naproxen, Nsaids (non-steroidal anti-inflammatory drug), Sulfa (sulfonamide antibiotics), and Tetracyclines    Home Medications  Current Outpatient Medications   Medication Instructions    amLODIPine (NORVASC) 5 mg, oral, Daily    atorvastatin (LIPITOR) 10 mg, oral, Daily    calcium carbonate/vitamin D3 (CALCIUM 600 WITH VITAMIN D3 ORAL) oral    cholecalciferol (VITAMIN D-3) 25 mcg, oral, Daily    cranberry fruit (cranberry) 450 mg tablet 1 tablet, oral, Daily    d-mannose 500 mg capsule 1 capsule, oral, Daily    donepezil (ARICEPT) 10 mg, oral, Daily " "   lisinopril 5 mg, oral, Daily    melatonin 3 mg, oral, Nightly    memantine (NAMENDA) 10 mg, oral, 2 times daily    metFORMIN (GLUCOPHAGE) 500 mg, oral, Daily with breakfast    metFORMIN XR (GLUCOPHAGE-XR) 500 mg, oral, Daily with evening meal    mirtazapine (REMERON) 7.5 mg, oral, Nightly    QUEtiapine (SEROQUEL) 25 mg, oral, Daily    venlafaxine (EFFEXOR) 25 mg, oral, Daily          Objective   24h Vitals  Heart Rate:  [47-70]   Temperature:  [36.5 °C (97.7 °F)]   Respirations:  [15-27]   BP: (141-166)/(71-81)   Height:  [157.5 cm (5' 2\")]   Weight:  [61.2 kg (135 lb)]   Pulse Ox:  [95 %-100 %]      Physical Exam  Neurological Exam  Physical Exam    MENTAL STATUS:  General appearance: in NAD  Orientation: oriented to self and place   Language: normal   Concentration: Intact  Fund of knowledge: impaired     CRANIAL NERVES:  - Fundoscopic exam: Deferred   - II/III: PERRL  - II:  Visual fields intact to confrontation bilaterally   - III, IV, VI: EOMI to pursuit without nystagmus  - V: V1-V3 sensation intact bilaterally  - VII: Face muscles symmetric with smile and eye closure  - VIII: Intact to finger rub  - IX, X: Palate elevated symmetrically bilaterally, no hoarseness  - XI: 5/5 strength on shoulder shrugging bilaterally  - XII: Tongue midline without atrophy or fasciculation    MOTOR: paratonia BUE, low muscle bulk   STRENGTH: 5/5 strength tested proximally and distally in BUE and BLE     REFLEXES: R L  Biceps   +2 +2  Brachioradialis +2 +2  Patellar   +2 +2  Achilles   +1 +1  COORDINATION: Intact on finger to nose bl except mild tremor near target, intact on heel to shin bl, KALEIGH intact bl  SENSORY: Intact to light touch in BUE and BLE  GAIT: able to stand and walk though need redirections about going back into her seat          Recent Labs  Results from last 72 hours   Lab Units 07/24/25  1027 07/22/25  0925   SODIUM mmol/L 132* 133*   POTASSIUM mmol/L 3.6 4.1   BUN mg/dL 11 13   CREATININE mg/dL 0.85 0.72 "   CALCIUM mg/dL 9.4 9.4   MAGNESIUM mg/dL 1.93  --       Results from last 72 hours   Lab Units 07/24/25  1027 07/22/25  0925   WBC AUTO x10*3/uL 7.1 6.7   HEMOGLOBIN g/dL 13.0 13.6   HEMATOCRIT % 37.4 40.0   PLATELETS AUTO x10*3/uL 259 272      Results from last 72 hours   Lab Units 07/24/25  1056   INR  1.0       Lab Results   Component Value Date    HGBA1C 6.6 (H) 04/23/2024    HGBA1C 6.0 05/08/2023    HGBA1C 6.2 (H) 03/31/2022        Imaging  No CT head results found for the past 14 days  [unfilled]  CT angio head and neck w and wo IV contrast  Result Date: 7/24/2025  1. No acute intracranial abnormality. Progression of small-vessel ischemic disease since 2018. 2. Patency of the cervical arteries. 3. Patency of the intracranial arteries without significant stenoses or aneurysm formation.       I personally reviewed the images/study and I agree with the findings as stated by Venkata Mathias DO   MACRO: None None.   Signed by: Merlyn Vicente 7/24/2025 12:26 PM Dictation workstation:   [id number]           IV Thrombolysis IV Thrombolysis Checklist      IV Thrombolysis Given: No; Thrombolysis contraindication reason: Neurologic signs are mild and judged to be non-disabling and spontaneously resolved           Assessment/Plan   Liv Saucedo is a 85 y.o. female with PMH of HTN, HLD, vascular dementia reportedly, who presented to [facility] on 7/24/2025 with near syncope and transient R facial droop during ED evaluation.     Diagnoses:  Near syncope     Recommendations:  MR brain is reasonable for R facial droop   Already had CTA done and unremarkable  consider cardiology eval     I personally spent 60 minutes today, exclusive of procedures, providing care for this patient, including preparation, face to face time, documentation and other services such as review of medical records, diagnostic result, patient education, counseling, coordination of care as specified in the encounter.     CODING and DOCUMENTATION  DETERMINATION  For the Evaluation and Management of this patient, the level of Medical Decision Making for this visit was determined based on the following:    The level of COMPLEXITY AND NUMBER OF PROBLEMS ADDRESSED was:  HIGH: one acute or chronic illness or injury that poses a threat to life or bodily function.    The AMOUNT/COMPLEXITY OF DATA TO REVIEW (reviewed, ordered or call for) was:  EXTENSIVE (need two of the three): my review of prior external notes and testing results as well as ordering a new unique test. and my independent interpretation a test performed by another provider.    The level of RISK OF COMPLICATIONS was:  LOW: A low risk of morbidity from additional diagnostic testing or treatment.     Thus, the level of medical decision making (based on the lower of the two highest elements) was determined to be [HIGH]. Therefore the appropriate E/M code for this encounter is [92535/93994].       [1]   Past Medical History:  Diagnosis Date    Dementia     GERD (gastroesophageal reflux disease)     High cholesterol     Hypertension     Pre-diabetes     Right shoulder pain    [2]   Past Surgical History:  Procedure Laterality Date    CT ABDOMEN ANGIOGRAM W AND/OR WO IV CONTRAST  6/19/2013    CT ABDOMEN ANGIOGRAM W AND/OR WO IV CONTRAST [facility] CLINICAL LEGACY    TOTAL SHOULDER ARTHROPLASTY  07/15/2016    Shoulder Arthroplasty Total Shoulder Replacement   [3]   Social History  Tobacco Use    Smoking status: Never    Smokeless tobacco: Never   Vaping Use    Vaping status: Never Used   Substance Use Topics    Alcohol use: Yes     Comment: rarely    Drug use: Never

## 2025-07-24 NOTE — H&P
History Of Present Illness  Liv Saucedo is a 85 y.o. female presenting with near syncope.  Patient is an 85 years old female with past medical history of vascular dementia, prediabetes, hypertension, hyperlipidemia, anxiety,  History was provided from her  present in the room.  Patient lives in a memory unit at [facility].  According to the patient she got frustrated.  Per nursing home staff patient had an episode of near syncope.  She was brought in hospital for further evaluation treatment.  According to NP in the ED patient had an episode of right facial droop, and had funny feeling around her lips.  Patient was admitted to hospital for further evaluation treatment  According to her  patient likes to wander around.  Patient denied with chest pain or shortness of breath.  She denied blurry vision or loss of vision.  Patient is alert and oriented to herself.  Patient denied to have fever or chills.  No contact with any sick person.  Brain attack was called in ER.  NIH stroke scale was 3.  Teleneuro stroke team was contacted.  Patient was not candidate for TNK.  Teleneuro stroke recommended Plavix, aspirin, MRI brain, evaluation by neurology  Patient was admitted in stepdown unit.     Past Medical History  Medical History[1]    Surgical History  Surgical History[2]     Social History  She reports that she has never smoked. She has never used smokeless tobacco. She reports current alcohol use. She reports that she does not use drugs.    Family History  Family History[3]     Allergies  Lorazepam, Naproxen, Nsaids (non-steroidal anti-inflammatory drug), Sulfa (sulfonamide antibiotics), and Tetracyclines    Review of Systems  General: Negative for fever,  chills or fatigue.    HEENT: Negative for headache, blurring of vision or double vision.    Cardiovascular: Negative for chest pain, palpitations or orthopnea.    Respiratory: Negative for cough, shortness of breath or wheezing.    Gastrointestinal:  "Negative for nausea, vomiting, hematemesis, abdominal pain or diarrhea.   Genitourinary: Negative for dysuria, hematuria, frequency or nocturia.   Musculoskeletal: Negative for joint pain, joint swelling or deformity.   Skin: Negative for rash, itching, or jaundice.   Hematologic: Negative for bleeding or bruising.   Neurologic: Negative for headache, loss of consciousness.  Near syncope   Psychological: Positive for anxiety  Physical Exam   General:  cooperating during physical exam.  HEENT: Pupils are equal and reactive to light and commendation , oral mucosa moist, no JVD   Cardiovascular: PMI nondisplaced  Lungs: Clear to auscultation bilaterally, no wheezing, no crackles, no dullness to percussion.  Abdomen: No hepatosplenomegaly appreciated, soft , not tender, positive bowel sounds, positive bowel movement.  Neuro: Alert and oriented x2, strength in upper and lower extremities , sensation intact.  Psych: Not great insight was good  Musculoskeletal: No swelling in lower extremities, no limitation in range of motion.  Vascular: Pulses are intact in upper and lower extremities  Skin: No petechiae, ecchymosis or other stigmata for dermatology disease.   Last Recorded Vitals  Blood pressure 141/71, pulse 64, temperature 36.5 °C (97.7 °F), temperature source Temporal, resp. rate 15, height 1.575 m (5' 2\"), weight 61.2 kg (135 lb), SpO2 95%.    Relevant Results    Assessment and plan    Transient ischemic attack  Patient had right facial numbness in ED.  CT brain no acute intracranial finding  Teleneuro stroke team contacted.  Aspirin and Plavix recommended  Continue with statin  Permissive hypertension  I will order MRI brain, 2D echo  Consult Dr. Baez from neurology services  Monitor in stepdown unit  PT /OT to see her.    Near syncope  I am not quite sure if patient had near syncope.  Check orthostatics  CT brain no acute intracranial findings.  2D echo    Hypertension  Fairly controlled    Diabetes mellitus type " 2  Check hemoglobin A1c    Vascular dementia with agitation  Continue with donepezil    Depression  Patient is alert and oriented to herself only  Discussed in detail with her  present in the room.  Patient is on Effexor and quetiapine    DVT prophylaxis    Hyponatremia  Check TSH  Monitor close  Hold SSRIs today    Discussed with her  present in the room    Patient is DNR CCA/DNI  Fall precaution  Ambulate with assistant  Bed alarm on  Sitter      I spent 60 minutes in the professional and overall care of this patient.      Bird Lee MD         [1]   Past Medical History:  Diagnosis Date    Dementia     GERD (gastroesophageal reflux disease)     High cholesterol     Hypertension     Pre-diabetes     Right shoulder pain    [2]   Past Surgical History:  Procedure Laterality Date    CT ABDOMEN ANGIOGRAM W AND/OR WO IV CONTRAST  6/19/2013    CT ABDOMEN ANGIOGRAM W AND/OR WO IV CONTRAST [facility] CLINICAL LEGAC[facility]    TOTAL SHOULDER ARTHROPLASTY  07/15/2016    Shoulder Arthroplasty Total Shoulder Replacement   [3]   Family History  Problem Relation Name Age of Onset    Breast cancer Mother          cause of death    Multiple sclerosis Mother      Other (bladder cancer) Father          cause of death    Liver disease Sister          End stage    Obesity Sister      Diabetes Sister      Hypothyroidism Sister      Hashimoto's thyroiditis Sister      Osteoporosis Sister      Dystonia Sister          Cervical    Other (Vascular dementia) Other          Father's second cousin    Other (lewy body dementia) Other          Father's side    Breast cancer Other          sister

## 2025-07-24 NOTE — CONSULTS
"Inpatient consult to Neuro TeleStroke  Consult performed by: Raissa Renteria MD  Consult ordered by: HILTON Warren-CNP        Virtual Visit start time: 1143 am    History Of Present Illness:  Historian: Patient, Family, and ED Provider   Liv Saucedo is a 85 y.o. female presenting with confusion. Pt reports that she has h/o dementia and had episodes of confusion in the past but the one she had today felt different. She had difficulty finding words and got frustrated. She could not think straight. Per EMS report, she was sitting down with head down and frustrated, not knowing what's going on in the facility, p[rompting the call..     While in ED, she was witnessed to have 30 second episode of R facial weakness and numbness by ED STEPHANY provider that resolved spontaneously.     Last known well: unclear, pt was not able to say time but possibly couple of hours before ED presentation  Had stroke symptoms resolved at time of presentation: Yes   Current antiplatelet/anticoagulant use: None      Stroke Risk Factors:  Hypertension and Hyperlipidemia    Last Recorded Vitals:  Blood pressure 141/71, pulse 64, temperature 36.5 °C (97.7 °F), temperature source Temporal, resp. rate 15, height 1.575 m (5' 2\"), weight 61.2 kg (135 lb), SpO2 95%.    Physical Exam:  Alert, Oriented.  Normal speech and language.  Symmetric face.  Intact peripheral visual fields.  No drift in any of the extremities.  No ataxia    [facility] NIHSS:   NIH Stroke Scale:     1A. Level of Consciousness:  Alert (keenly responsive) (0)    1B. Ask Month and Age:  Both questions right (0)    1C. Blink Eyes & Squeeze Hands:  Performs both tasks (0)    2. Best Gaze:  Normal (0)    3. Visual:  No visual loss (0)    4. Facial Palsy:  Normal symmetry (0)    5A. Motor - Left Arm:  No drift (0)    5B. Motor - Right Arm:  No drift (0)    6A. Motor - Left Leg:  No drift (0)    6B. Motor - Right Leg:  No drift (0)    7. Limb Ataxia:  No ataxia (0)    8. Sensory Loss: "  Normal (no sensory loss) (0)    9. Best Language:  Normal (no aphasia) (0)    10. Dysarthia:  Normal (0)    11. Extinction and Inattention:  No abnormality (0)    NIH Stroke Scale:  0         Relevant Results:  LABS:  Glucose   Date Value Ref Range Status   07/24/2025 73 (L) 74 - 99 mg/dL Final     INR   Date Value Ref Range Status   07/24/2025 1.0 0.9 - 1.2 Final      Results for orders placed or performed during the hospital encounter of 07/24/25 (from the past 24 hours)   ECG 12 lead   Result Value Ref Range    Ventricular Rate 61 BPM    Atrial Rate 61 BPM    MA Interval 250 ms    QRS Duration 78 ms    QT Interval 394 ms    QTC Calculation(Bazett) 396 ms    P Axis 72 degrees    R Axis -65 degrees    T Axis 69 degrees    QRS Count 10 beats    Q Onset 213 ms    P Onset 88 ms    P Offset 143 ms    T Offset 410 ms    QTC Fredericia 395 ms   CBC and Auto Differential   Result Value Ref Range    WBC 7.1 4.4 - 11.3 x10*3/uL    nRBC 0.0 0.0 - 0.0 /100 WBCs    RBC 4.41 4.00 - 5.20 x10*6/uL    Hemoglobin 13.0 12.0 - 16.0 g/dL    Hematocrit 37.4 36.0 - 46.0 %    MCV 85 80 - 100 fL    MCH 29.5 26.0 - 34.0 pg    MCHC 34.8 32.0 - 36.0 g/dL    RDW 12.3 11.5 - 14.5 %    Platelets 259 150 - 450 x10*3/uL    Neutrophils % 65.1 40.0 - 80.0 %    Immature Granulocytes %, Automated 0.1 0.0 - 0.9 %    Lymphocytes % 22.9 13.0 - 44.0 %    Monocytes % 8.5 2.0 - 10.0 %    Eosinophils % 2.4 0.0 - 6.0 %    Basophils % 1.0 0.0 - 2.0 %    Neutrophils Absolute 4.61 1.60 - 5.50 x10*3/uL    Immature Granulocytes Absolute, Automated 0.01 0.00 - 0.50 x10*3/uL    Lymphocytes Absolute 1.62 0.80 - 3.00 x10*3/uL    Monocytes Absolute 0.60 0.05 - 0.80 x10*3/uL    Eosinophils Absolute 0.17 0.00 - 0.40 x10*3/uL    Basophils Absolute 0.07 0.00 - 0.10 x10*3/uL   Comprehensive Metabolic Panel   Result Value Ref Range    Glucose 73 (L) 74 - 99 mg/dL    Sodium 132 (L) 136 - 145 mmol/L    Potassium 3.6 3.5 - 5.3 mmol/L    Chloride 97 (L) 98 - 107 mmol/L     Bicarbonate 28 21 - 32 mmol/L    Anion Gap 11 10 - 20 mmol/L    Urea Nitrogen 11 6 - 23 mg/dL    Creatinine 0.85 0.50 - 1.05 mg/dL    eGFR 68 >60 mL/min/1.73m*2    Calcium 9.4 8.6 - 10.3 mg/dL    Albumin 4.5 3.4 - 5.0 g/dL    Alkaline Phosphatase 64 33 - 136 U/L    Total Protein 6.7 6.4 - 8.2 g/dL    AST 15 9 - 39 U/L    Bilirubin, Total 0.5 0.0 - 1.2 mg/dL    ALT 14 7 - 45 U/L   Magnesium   Result Value Ref Range    Magnesium 1.93 1.60 - 2.40 mg/dL   Troponin I, High Sensitivity, Initial   Result Value Ref Range    Troponin I, High Sensitivity <3 0 - 13 ng/L   Protime-INR   Result Value Ref Range    Protime 10.4 9.3 - 12.7 seconds    INR 1.0 0.9 - 1.2   APTT   Result Value Ref Range    aPTT 25.7 22.0 - 32.5 seconds   Troponin, High Sensitivity, 1 Hour   Result Value Ref Range    Troponin I, High Sensitivity 3 0 - 13 ng/L        CT Head Imaging:  CTH imaging personally reviewed, showed no acute ischemic / hemorrhagic changes     CTA Head and Neck Imaging:  CTA head and neck imaging personally reviewed, no large vessel occlusion or severe stenosis seen      Diagnosis:  ? TIA    Assessment/Plan:  85-year-old with history of dementia, hypertension, hyperlipidemia presenting with an episode of transient confusion followed by witnessed right facial weakness while in ED by the ED team.  Symptoms spontaneously resolved in less than 1 minute.  Reasonable to consider further TIA workup.  CT angio head and neck negative for any large vessel occlusion or severe stenosis.      IV Thrombolysis IV Thrombolysis Checklist        IV Thrombolysis Given: No; Thrombolysis contraindication reason: Neurologic signs have spontaneously resolved           Patient is a candidate for thrombectomy:  yes/no: No; contraindication reason: No evidence of proximal occlusion    Additional Recommendations:  MRI Brain w/o contrast stroke protocol or repeat CTH 24 hours after initial CTH if unable to get MRI.  TTE w/ bubble study.  Lipid panel,  A1c.  Load with 300mg Plavix now, then start 75mg daily tomorrow, Load with 325mg ASA now, then start 81mg daily starting tomorrow, and Per [facility] protocol- DAPT (ASA & Plavix) for 21 days then ASA monotherapy indefinitely   Lipid Goals: education on healthy diet and statin therapy to maintain or achieve goal LDL-cholesterol < 70mg. home dose of Lipitor 10 Mg..  Blood pressure goals: avoid hypotension SBP <100 that could worsen cerebral perfusion. Ischemic stroke- early permissive hypertension SBP < 220 mmHg with cautious inpatient lowering..  Glucose Goals: early treatment of hyperglycemia to goal glucose 140-180 mg/dl with long-term goal A1c < 7%.  NPO until nurse bedside swallow assessment.  Consider focused stroke order set.  Smoking Cessation and Education.  Assessment for Rehabilitation needs.  Patient and family education on signs and symptoms of stroke, calling 911, healthy strategies for stroke prevention.      Disposition:  Patient will remain at referring facility for further evaluation and management.    Virtual or Telephone Consent    An interactive audio and video telecommunication system which permits real time communications between the patient (at the originating site) and provider (at the distant site) was utilized to provide this telehealth service.   Verbal consent was requested and obtained from Liv Saucedo on this date, 07/24/25 for a telehealth visit.      Telestroke is covered in shift work. If there are further Neurological questions or concerns please contact your regional neurologist on call during daytime hours or contact the transfer center with an ADT20 order.    Raissa Renteria MD

## 2025-07-24 NOTE — CARE PLAN
The patient's goals for the shift include To get enough sleep    The clinical goals for the shift include remain free from falls, syncope    Over the shift, the patient did make progress toward the following goals. Barriers to progression include memory problems, poor safety awareness. Recommendations to address these barriers include continued monitoring.

## 2025-07-24 NOTE — PROGRESS NOTES
Pharmacy Medication History Review    Liv Saucedo is a 85 y.o. female. Pharmacy reviewed the patient's [id number] medications and allergies for accuracy.    Medications ADDED:  None   Medications CHANGED:  Lisinopril 5mg - 10mg dialy  Memantine 10mg - daily  Metformin 500mg XR - not taking - wrong formulation  Medications REMOVED:   None      The list below reflects the updated PTA list. Comments regarding how patient may be taking medications differently can be found in the Admit Orders Activity  Prior to Admission Medications   Prescriptions Last Dose Informant   QUEtiapine (SeroqueL) 25 mg tablet Unknown Other   Sig: Take 1 tablet (25 mg) by mouth once daily.   amLODIPine (Norvasc) 5 mg tablet Unknown Other   Sig: TAKE 1 TABLET BY MOUTH ONCE  DAILY   atorvastatin (Lipitor) 10 mg tablet Unknown Other   Sig: Take 1 tablet (10 mg) by mouth once daily.   calcium carbonate/vitamin D3 (CALCIUM 600 WITH VITAMIN D3 ORAL) Unknown Other   Sig: Take by mouth.   cholecalciferol (Vitamin D-3) 25 MCG (1000 UT) capsule Unknown Other   Sig: Take 1 capsule (25 mcg) by mouth once daily.   cranberry fruit (cranberry) 450 mg tablet Unknown Other   Sig: Take 1 tablet by mouth once daily.   d-mannose 500 mg capsule Unknown Other   Sig: Take 1 capsule by mouth once daily.   donepezil (Aricept) 10 mg tablet  Other   Sig: TAKE 1 TABLET BY MOUTH ONCE  DAILY   lisinopril 5 mg tablet Unknown Other   Sig: TAKE 1 TABLET BY MOUTH ONCE  DAILY   Patient taking differently: Take 2 tablets (10 mg) by mouth once daily.   melatonin 3 mg tablet Unknown Other   Sig: Take 1 tablet (3 mg) by mouth once daily at bedtime.   memantine (Namenda) 10 mg tablet Unknown Other   Sig: Take 1 tablet (10 mg) by mouth 2 times a day.   Patient taking differently: Take 1 tablet (10 mg) by mouth once daily.   metFORMIN (Glucophage) 500 mg tablet Unknown Other   Sig: Take 1 tablet (500 mg) by mouth once daily with breakfast.   metFORMIN  mg 24 hr  "tablet  Other   Sig: Take 1 tablet (500 mg) by mouth once daily in the evening. Take with meals.   Patient not taking: Reported on 7/22/2025   mirtazapine (Remeron) 7.5 mg tablet Unknown Other   Sig: TAKE 1 TABLET BY MOUTH AT  BEDTIME   venlafaxine (Effexor) 37.5 mg tablet Unknown Other   Sig: Take 25 mg by mouth once daily.      Facility-Administered Medications: None        The list below reflects the updated allergy list. Please review each documented allergy for additional clarification and justification.  Allergies  Reviewed by Savannah Mac CPhT on 7/24/2025        Severity Reactions Comments    Lorazepam Medium Hallucinations     Naproxen Not Specified Unknown     Nsaids (non-steroidal Anti-inflammatory Drug) Not Specified Other \"hyponatremia    Sulfa (sulfonamide Antibiotics) Not Specified Other mental status change    Tetracyclines Low Other, Unknown Nausea             Pharmacy has been updated to [facility].    Sources used to complete the med history include facility medication list. [facility] -[phone]    Below are additional concerns with the patient's PTA list.  None - this facility does not provide last doses administered on paperwork provided to ER    Savannah Mac CPhT-Adv  Please reach out via [employer] Secure Chat for questions    "

## 2025-07-24 NOTE — PROGRESS NOTES
07/24/25 1820   Discharge Planning   Expected Discharge Disposition Othe     Patient is confused.  Attempted to call daughter (POA) Nunu Larsen to complete assessment. No answer.  Message was left.  POA paperwork is available in hospital records.

## 2025-07-25 VITALS
HEART RATE: 67 BPM | BODY MASS INDEX: 25.44 KG/M2 | SYSTOLIC BLOOD PRESSURE: 162 MMHG | HEIGHT: 62 IN | DIASTOLIC BLOOD PRESSURE: 97 MMHG | RESPIRATION RATE: 16 BRPM | OXYGEN SATURATION: 96 % | TEMPERATURE: 96.8 F | WEIGHT: 138.23 LBS

## 2025-07-25 LAB
ANION GAP SERPL CALCULATED.3IONS-SCNC: 10 MMOL/L (ref 10–20)
BUN SERPL-MCNC: 13 MG/DL (ref 6–23)
CALCIUM SERPL-MCNC: 8.7 MG/DL (ref 8.6–10.3)
CHLORIDE SERPL-SCNC: 98 MMOL/L (ref 98–107)
CO2 SERPL-SCNC: 29 MMOL/L (ref 21–32)
CREAT SERPL-MCNC: 0.87 MG/DL (ref 0.5–1.05)
EGFRCR SERPLBLD CKD-EPI 2021: 66 ML/MIN/1.73M*2
ERYTHROCYTE [DISTWIDTH] IN BLOOD BY AUTOMATED COUNT: 12.4 % (ref 11.5–14.5)
EST. AVERAGE GLUCOSE BLD GHB EST-MCNC: 140 MG/DL
GLUCOSE BLD MANUAL STRIP-MCNC: 109 MG/DL (ref 74–99)
GLUCOSE BLD MANUAL STRIP-MCNC: 110 MG/DL (ref 74–99)
GLUCOSE SERPL-MCNC: 98 MG/DL (ref 74–99)
HBA1C MFR BLD: 6.5 % (ref ?–5.7)
HCT VFR BLD AUTO: 34.3 % (ref 36–46)
HGB BLD-MCNC: 11.9 G/DL (ref 12–16)
MCH RBC QN AUTO: 29.1 PG (ref 26–34)
MCHC RBC AUTO-ENTMCNC: 34.7 G/DL (ref 32–36)
MCV RBC AUTO: 84 FL (ref 80–100)
NRBC BLD-RTO: 0 /100 WBCS (ref 0–0)
PLATELET # BLD AUTO: 257 X10*3/UL (ref 150–450)
POTASSIUM SERPL-SCNC: 3.9 MMOL/L (ref 3.5–5.3)
RBC # BLD AUTO: 4.09 X10*6/UL (ref 4–5.2)
SODIUM SERPL-SCNC: 133 MMOL/L (ref 136–145)
WBC # BLD AUTO: 5.3 X10*3/UL (ref 4.4–11.3)

## 2025-07-25 PROCEDURE — 85027 COMPLETE CBC AUTOMATED: CPT | Performed by: INTERNAL MEDICINE

## 2025-07-25 PROCEDURE — 2500000004 HC RX 250 GENERAL PHARMACY W/ HCPCS (ALT 636 FOR OP/ED): Performed by: INTERNAL MEDICINE

## 2025-07-25 PROCEDURE — 2500000002 HC RX 250 W HCPCS SELF ADMINISTERED DRUGS (ALT 637 FOR MEDICARE OP, ALT 636 FOR OP/ED): Performed by: INTERNAL MEDICINE

## 2025-07-25 PROCEDURE — 97161 PT EVAL LOW COMPLEX 20 MIN: CPT | Mod: GP | Performed by: PHYSICAL THERAPIST

## 2025-07-25 PROCEDURE — 2500000001 HC RX 250 WO HCPCS SELF ADMINISTERED DRUGS (ALT 637 FOR MEDICARE OP): Performed by: INTERNAL MEDICINE

## 2025-07-25 PROCEDURE — 99239 HOSP IP/OBS DSCHRG MGMT >30: CPT | Performed by: INTERNAL MEDICINE

## 2025-07-25 PROCEDURE — 82947 ASSAY GLUCOSE BLOOD QUANT: CPT

## 2025-07-25 PROCEDURE — 36415 COLL VENOUS BLD VENIPUNCTURE: CPT | Performed by: INTERNAL MEDICINE

## 2025-07-25 PROCEDURE — 97165 OT EVAL LOW COMPLEX 30 MIN: CPT | Mod: GO

## 2025-07-25 PROCEDURE — 80048 BASIC METABOLIC PNL TOTAL CA: CPT | Performed by: INTERNAL MEDICINE

## 2025-07-25 RX ORDER — NAPROXEN SODIUM 220 MG/1
81 TABLET, FILM COATED ORAL DAILY
Qty: 30 TABLET | Refills: 0 | Status: SHIPPED | OUTPATIENT
Start: 2025-07-25 | End: 2025-08-24

## 2025-07-25 RX ORDER — CLOPIDOGREL BISULFATE 75 MG/1
75 TABLET ORAL DAILY
Status: DISCONTINUED | OUTPATIENT
Start: 2025-07-25 | End: 2025-07-25 | Stop reason: HOSPADM

## 2025-07-25 RX ORDER — CLOPIDOGREL BISULFATE 75 MG/1
75 TABLET ORAL DAILY
Qty: 19 TABLET | Refills: 0 | Status: SHIPPED | OUTPATIENT
Start: 2025-07-25 | End: 2025-07-25 | Stop reason: HOSPADM

## 2025-07-25 RX ORDER — CLOPIDOGREL BISULFATE 75 MG/1
75 TABLET ORAL DAILY
Qty: 30 TABLET | Refills: 0 | Status: SHIPPED | OUTPATIENT
Start: 2025-07-25 | End: 2025-08-25

## 2025-07-25 RX ORDER — NAPROXEN SODIUM 220 MG/1
81 TABLET, FILM COATED ORAL DAILY
Qty: 30 TABLET | Refills: 0 | Status: SHIPPED | OUTPATIENT
Start: 2025-07-25 | End: 2025-07-25 | Stop reason: HOSPADM

## 2025-07-25 RX ADMIN — ASPIRIN 81 MG: 81 TABLET, CHEWABLE ORAL at 08:47

## 2025-07-25 RX ADMIN — POLYETHYLENE GLYCOL 3350 17 G: 17 POWDER, FOR SOLUTION ORAL at 08:46

## 2025-07-25 RX ADMIN — DONEPEZIL HYDROCHLORIDE 10 MG: 10 TABLET, FILM COATED ORAL at 08:46

## 2025-07-25 RX ADMIN — CLOPIDOGREL 75 MG: 75 TABLET ORAL at 08:47

## 2025-07-25 RX ADMIN — Medication 25 MCG: at 08:46

## 2025-07-25 RX ADMIN — QUETIAPINE FUMARATE 25 MG: 25 TABLET ORAL at 08:46

## 2025-07-25 RX ADMIN — FAMOTIDINE 20 MG: 20 TABLET, FILM COATED ORAL at 08:46

## 2025-07-25 RX ADMIN — MEMANTINE 10 MG: 5 TABLET ORAL at 08:46

## 2025-07-25 ASSESSMENT — PAIN SCALES - GENERAL
PAINLEVEL_OUTOF10: 0 - NO PAIN

## 2025-07-25 ASSESSMENT — COGNITIVE AND FUNCTIONAL STATUS - GENERAL
HELP NEEDED FOR BATHING: A LOT
DRESSING REGULAR UPPER BODY CLOTHING: A LITTLE
STANDING UP FROM CHAIR USING ARMS: A LOT
CLIMB 3 TO 5 STEPS WITH RAILING: A LOT
WALKING IN HOSPITAL ROOM: A LITTLE
DAILY ACTIVITIY SCORE: 16
STANDING UP FROM CHAIR USING ARMS: A LITTLE
MOVING TO AND FROM BED TO CHAIR: A LITTLE
TURNING FROM BACK TO SIDE WHILE IN FLAT BAD: A LITTLE
PERSONAL GROOMING: A LITTLE
DRESSING REGULAR UPPER BODY CLOTHING: A LOT
MOVING FROM LYING ON BACK TO SITTING ON SIDE OF FLAT BED WITH BEDRAILS: A LITTLE
DAILY ACTIVITIY SCORE: 15
WALKING IN HOSPITAL ROOM: A LOT
MOBILITY SCORE: 16
EATING MEALS: A LITTLE
HELP NEEDED FOR BATHING: A LOT
PERSONAL GROOMING: A LITTLE
TURNING FROM BACK TO SIDE WHILE IN FLAT BAD: A LITTLE
EATING MEALS: A LITTLE
DRESSING REGULAR LOWER BODY CLOTHING: A LOT
CLIMB 3 TO 5 STEPS WITH RAILING: A LOT
MOVING TO AND FROM BED TO CHAIR: A LITTLE
TOILETING: A LITTLE
DRESSING REGULAR LOWER BODY CLOTHING: A LOT
MOBILITY SCORE: 17
TOILETING: A LITTLE

## 2025-07-25 ASSESSMENT — ACTIVITIES OF DAILY LIVING (ADL)
BATHING_ASSISTANCE: MODERATE
ADL_ASSISTANCE: NEEDS ASSISTANCE

## 2025-07-25 ASSESSMENT — PAIN - FUNCTIONAL ASSESSMENT: PAIN_FUNCTIONAL_ASSESSMENT: 0-10

## 2025-07-25 NOTE — CARE PLAN
Problem: General Stroke  Goal: Establish a mutual long term goal with patient by discharge  Outcome: Progressing  Goal: Demonstrate improvement in neurological exam throughout the shift  Outcome: Progressing  Goal: Maintain BP within ordered limits throughout shift  Outcome: Progressing  Goal: Participate in treatment (ie., meds, therapy) throughout shift  Outcome: Progressing  Goal: No symptoms of aspiration throughout shift  Outcome: Progressing  Goal: No symptoms of hemorrhage throughout shift  Outcome: Progressing  Goal: Tolerate enteral feeding throughout shift  Outcome: Progressing  Goal: Decreased nausea/vomiting throughout shift  Outcome: Progressing  Goal: Controlled blood glucose throughout shift  Outcome: Progressing  Goal: Out of bed three times today  Outcome: Progressing     Problem: ICU Stroke  Goal: Maintain ICP within ordered limits throughout shift  Outcome: Progressing  Goal: Tolerate EVD clamping trial throughout shift  Outcome: Progressing  Goal: Tolerate ventilator weaning trial during shift  Outcome: Progressing  Goal: Maintain patent airway throughout shift  Outcome: Progressing  Goal: Achieve/maintain targeted sodium level throughout shift  Outcome: Progressing     Problem: Pain - Adult  Goal: Verbalizes/displays adequate comfort level or baseline comfort level  Outcome: Progressing     Problem: Safety - Adult  Goal: Free from fall injury  Outcome: Progressing     Problem: Discharge Planning  Goal: Discharge to home or other facility with appropriate resources  Outcome: Progressing     Problem: Chronic Conditions and Co-morbidities  Goal: Patient's chronic conditions and co-morbidity symptoms are monitored and maintained or improved  Outcome: Progressing     Problem: Nutrition  Goal: Nutrient intake appropriate for maintaining nutritional needs  Outcome: Progressing     Problem: Fall/Injury  Goal: Not fall by end of shift  Outcome: Progressing  Goal: Be free from injury by end of the  shift  Outcome: Progressing  Goal: Verbalize understanding of personal risk factors for fall in the hospital  Outcome: Progressing  Goal: Verbalize understanding of risk factor reduction measures to prevent injury from fall in the home  Outcome: Progressing  Goal: Use assistive devices by end of the shift  Outcome: Progressing  Goal: Pace activities to prevent fatigue by end of the shift  Outcome: Progressing     Problem: Skin  Goal: Decreased wound size/increased tissue granulation at next dressing change  Outcome: Progressing  Goal: Participates in plan/prevention/treatment measures  Outcome: Progressing  Goal: Prevent/manage excess moisture  Outcome: Progressing  Goal: Prevent/minimize sheer/friction injuries  Outcome: Progressing  Goal: Promote/optimize nutrition  Outcome: Progressing  Goal: Promote skin healing  Outcome: Progressing   The patient's goals for the shift include To get enough sleep    The clinical goals for the shift include FREE OF FALLS, REST

## 2025-07-25 NOTE — PROGRESS NOTES
"Neurology Follow Up    Subjective:  Ms. Saucedo is a 85 y.o. female admitted 7/24/2025, LOS 1. Neurology is consulted for near syncope.     Pt seen and examined. Resting in bed at this time. Pleasantly confused with sitter at the bedside. Pt denies any issues at this time. Denies any dizziness today. Pt states she feeling anxious with all of the recent changes.     Spoke with spouse and nurse at [place] and pt is currently not on ASA or Plavix. Hasn't had any falls either. Spouse denies any history of bleeding problems.       Objective:  Blood pressure 160/83, pulse 72, temperature 36.3 °C (97.3 °F), temperature source Temporal, resp. rate 18, height 1.575 m (5' 2\"), weight 62.7 kg (138 lb 3.7 oz), SpO2 95%.    Physical Exam:  Neurological Exam:  General: NAD, cooperative   Neuro:  Awake alert person and place, language normal, no dysarthria    Visual fields full  EOM full range, flores 3-4 mm, no nystagmus   Smile sym  Tongue midline   Strength 5/5 throughout tested proximally and distally  Tone and bulk normal   Reflexes:      R          L  BR:               2          2  Biceps:         2          2  Knee:           2          2  Ankle:          1          1  Toes down  Sensory intact to light touch  FTN intact on finger to nose with slight tremor bilaterally near target      Reviewed relevant results, independent review/interpretation of imaging:  CTA- negative for any significant stenosis or occlusion. White matter changes noted.   MR brain - negative for acute infarct.  Moderate to extensive white matter FLAIR signal increase, most commonly seen with chronic small vessel ischemic change. Presence of older small infarctions in these areas not excluded. 3. Degree of ventriculomegaly is commensurate with overall degree of brain parenchymal volume loss, which is mild to moderate.      Assessment:   Liv Saucedo is a 85 y.o. female with PMH of HTN, HLD, vascular dementia reportedly, who [facility] with near syncope and transient R facial droop during ED evaluation.     7/25- Neurologic exam remains the same. No syncopal episodes overnight. MRI shows negative for a stroke, does show extensive white matter disease consistent with pt history of vascular dementia. Transient Rt facial droop could have been TIA, therefore would recommend DAPT 21 followed by ASA. This doesn't however explain the near syncope and dizziness and given CTA is unremarkable likely not neurologic and could consider cardiac evaluation.      Diagnosis:  Near syncope  TIA     Recommendations:  DAPT for 21 days, then continue ASA 81mg monotherapy daily  LDL at goal therefore can continue current dose of atorvastatin 10mg.   Consider cardiology evaluation  No further neurology work up at this time. Pt can follow up with neurology in 1-2 months.     Thank you for the consult. Will sign off. Please do not hesitate to reach out with any questions or any new change in patient's neurological status.         I spent 45 minutes in the professional and overall care of this patient. I reviewed patient imaging, discussed patient and plan with Dr Ahumada.       Monie Ghosh, APRN-CNP

## 2025-07-25 NOTE — NURSING NOTE
Attempted to call report again to the direct nurse line.  Message left on the voicemail. No return call from earlier attempt to call report.

## 2025-07-25 NOTE — DISCHARGE SUMMARY
Discharge Diagnosis  Syncope, unspecified syncope type           Issues Requiring Follow-Up  Dementia  Hypertension  TIA      Discharge Meds     Medication List      START taking these medications     aspirin 81 mg chewable tablet; Chew and swallow 1 tablet (81 mg) once   daily.   clopidogrel 75 mg tablet; Commonly known as: Plavix; Take 1 tablet (75   mg) by mouth once daily for 19 days. For 19 days on the only     CHANGE how you take these medications     metFORMIN 500 mg tablet; Commonly known as: Glucophage; What changed:   Another medication with the same name was removed. Continue taking this   medication, and follow the directions you see here.     CONTINUE taking these medications     amLODIPine 5 mg tablet; Commonly known as: Norvasc; TAKE 1 TABLET BY   MOUTH ONCE  DAILY   atorvastatin 10 mg tablet; Commonly known as: Lipitor; Take 1 tablet (10   mg) by mouth once daily.   CALCIUM 600 WITH VITAMIN D3 ORAL   cholecalciferol 25 mcg (1,000 units) capsule; Commonly known as: Vitamin   D-3; Take 1 capsule (25 mcg) by mouth once daily.   donepezil 10 mg tablet; Commonly known as: Aricept; TAKE 1 TABLET BY   MOUTH ONCE  DAILY   lisinopril 5 mg tablet; TAKE 1 TABLET BY MOUTH ONCE  DAILY   memantine 10 mg tablet; Commonly known as: Namenda; Take 1 tablet (10   mg) by mouth 2 times a day.   mirtazapine 7.5 mg tablet; Commonly known as: Remeron; TAKE 1 TABLET BY   MOUTH AT  BEDTIME   QUEtiapine 25 mg tablet; Commonly known as: SeroqueL; Take 1 tablet (25   mg) by mouth once daily.   venlafaxine 37.5 mg tablet; Commonly known as: Effexor     STOP taking these medications     cranberry 450 mg tablet; Generic drug: cranberry fruit   d-mannose 500 mg capsule   melatonin 3 mg tablet       Test Results Pending At Discharge  Pending Labs       Order Current Status    Extra Urine Gray Tube In process    Urinalysis with Reflex Culture and Microscopic In process            Hospital Course     Patient is an 85 years old female  past medical history of vascular dementia, prediabetes, hypertension hyperlipidemia anxiety.  Patient presented to [facility] ER concerning for near syncope.  Patient stated clear she was getting frustrated with nursing home staff.  Patient was brought in the hospital for further evaluation treatment.  In the ER patient had transition episode of right facial droop and having funny feeling around her lips.  Patient was admitted to hospital.  Teleneuro stroke was contacted.  Patient had CT head.  No acute intracranial finding.  Teleneuro stroke physician recommended aspirin and and Plavix for 21 days and continue aspirin 81 daily indefinitely.  Patient had MRI brain.  MRI brain no evidence of acute infarct or intracranial masses or midline shift.  There are concerning for chronic small vessel ischemic changes presence of old small infarction are not included.  CT angio head and neck no evidence of stenosis.  2D echo revealed left ventricular ejection fraction 65 to 70% there is normal right ventricular global systolic function.  Patient will be evaluated by physical therapy.  Plan to discharge patient to [place] after seen by neurology.  Patient will be discharged on Plavix and aspirin for 21 days and continue with aspirin 81 mg daily indefinitely.  Patient is on statins.  Patient is clinically and hemodynamically stable to get discharged today advised her to follow-up with neurology and PCP as outpatient.        Pertinent Physical Exam At Time of Discharge  Physical Exam  General: In non acute distress, cooperating during physical exam.  HEENT: Pupils are equal and reactive to light and commendation , oral mucosa moist, no JVD oral mucosa is moist.  Cardiovascular: PMI nondisplaced  Lungs: Clear to auscultation bilaterally, no wheezing, no crackles, no dullness to percussion.  Abdomen: No hepatosplenomegaly appreciated, soft , not tender, positive bowel sounds, positive bowel movement.  Neuro: Alert and oriented  x1 strength in upper and lower extremities , sensation intact.  Musculoskeletal: No swelling in lower extremities, no limitation in range of motion.  Vascular: Pulses are intact in upper and lower extremities  Skin: No petechiae, ecchymosis or other stigmata for dermatology disease.   Outpatient Follow-Up  Future Appointments   Date Time Provider Department Center   8/20/2025 10:00 AM Reese Short MD [id number] [facility]     Follow-up with neurology in 3 weeks  Follow-up with PCP in 2 weeks.    Time spent discharge patient 38 minutes      Bird Lee MD

## 2025-07-25 NOTE — PROGRESS NOTES
Physical Therapy    Physical Therapy Evaluation    Patient Name: Liv Saucedo  MRN: [id number]  Department: [facility]  Room: 15/15-A  Today's Date: 7/25/2025   Time Calculation  Start Time: 1335  Stop Time: 1347  Time Calculation (min): 12 min    Assessment/Plan   PT Assessment  PT Assessment Results: Decreased strength, Decreased mobility, Decreased endurance, Impaired balance, Decreased cognition, Decreased safety awareness  Rehab Prognosis: Good  Barriers to Discharge Home: No anticipated barriers  Strengths: Support and attitude of living partners, Premorbid level of function, Living arrangement secure, Housing layout, Attitude of self  Barriers to Participation: Comorbidities  End of Session Communication: Bedside nurse  Assessment Comment: She presented with the above listed impairments (see Assessment Results). Pt required up to CGA during functional mobility; an increase from her reported baseline of indep. Pt would benefit from continued skilled PT services for maximizing independence and safety prior to & after discharge (LOW intensity).  End of Session Patient Position: Bed, 3 rail up, Alarm off, caregiver present (sitter in room)  IP OR SWING BED PT PLAN  Inpatient or Swing Bed: Inpatient  PT Plan  Treatment/Interventions: Bed mobility, Transfer training, Gait training, Strengthening, Therapeutic exercise, Therapeutic activity  PT Plan: Ongoing PT  PT Frequency: 4 times per week  PT Discharge Recommendations: Low intensity level of continued care  PT Recommended Transfer Status: Contact guard, Stand by assist, Assistive device  PT - OK to Discharge: Yes      Subjective     PT Visit Info:  PT Received On: 07/25/25    General Visit Information:  General  Reason for Referral: Impaired functional mobility due to decreased muscular strength. This 85 year old was admitted for TIA.  Past Medical History Relevant to Rehab: vascular dementia, prediabetes, hypertension, hyperlipidemia, anxiety, GERD, osteoporosis,  overactive bladder, OA,  Family/Caregiver Present: No  Prior to Session Communication: Bedside nurse  Patient Position Received: Bed, 3 rail up, Alarm off, caregiver present (sitter in room)  General Comment: Cleared by RN for participation. Pt agreed to session and was fully engaged throughout.    Home Living:  Home Living  Type of Home: Memory Care unit  Lives With: Other (Comment) (care staff)  Home Adaptive Equipment: None  Home Layout: One level    Prior Level of Function:  Prior Function Per Pt/Caregiver Report  Receives Help From: Other (Comment) (care staff)  ADL Assistance: Needs assistance  Homemaking Assistance: Needs assistance  Ambulatory Assistance: Independent (denied any recent falls)    Precautions:  Precautions  Hearing/Visual Limitations: glasses donned  Medical Precautions: Fall precautions      Date/Time Vitals Session Patient Position Pulse Resp SpO2 BP MAP (mmHg)    07/25/25 1300 --  --  --  --  21 %  --  --         Objective   Pain:  Pain Assessment  0-10 (Numeric) Pain Score: 0 - No pain    Cognition:  Cognition  Overall Cognitive Status: Impaired at baseline (2° to dementia)  Orientation Level: Disoriented to time, Disoriented to situation, Disoriented to place  Cognition Comments: Flat affect noted.    General Assessments:  Activity Tolerance  Endurance: Decreased tolerance for upright activites    Sensation  Sensation Comment: Not tested; pt denied paresthesias    ROM  BLE AROM: grossly WFL via observation    Strength  Strength Comments: BLE: grossly >/=3+/5 via observation of mobility    Coordination  Movements are Fluid and Coordinated: Yes    Postural Control  Postural Control: Within Functional Limits  Posture Comment: fwd head posture    Static Sitting Balance  Static Sitting-Balance Support: Bilateral upper extremity supported, Feet supported  Static Sitting-Level of Assistance: Close supervision    Static Standing Balance  Static Standing-Balance Support: No upper extremity  supported  Static Standing-Level of Assistance: Contact guard  Dynamic Standing Balance  Dynamic Standing-Balance Support: No upper extremity supported  Dynamic Standing-Level of Assistance: Contact guard    Functional Assessments:  Bed Mobility  Bed Mobility: Yes  Bed Mobility 1  Bed Mobility 1: Supine to sitting, Sitting to supine  Level of Assistance 1: Close supervision (HOB elevated >/=30°, used bed rail, and toward L side)    Transfers  Transfer: Yes  Transfer 1  Technique 1: Sit to stand, Stand to sit  Transfer Level of Assistance 1: Close supervision (x1 trial each at EOB)    Ambulation/Gait Training  Ambulation/Gait Training Performed: Yes  Ambulation/Gait Training 1  Surface 1: Level tile  Device 1: No device  Assistance 1: Contact guard  Quality of Gait 1: Decreased step length (Pt ambulated ~5 ft fwd/retro using very short bilateral step length with slow velocity. No threat for LOB.)     Outcome Measures:  [facility] Basic Mobility  Turning from your back to your side while in a flat bed without using bedrails: A little  Moving from lying on your back to sitting on the side of a flat bed without using bedrails: A little  Moving to and from bed to chair (including a wheelchair): A little  Standing up from a chair using your arms (e.g. wheelchair or bedside chair): A little  To walk in hospital room: A little  Climbing 3-5 steps with railing: A lot  Basic Mobility - Total Score: 17    Encounter Problems       Encounter Problems (Active)       PT Problem       Pt will transition supine<>sit with mod I.        Start:  07/25/25    Expected End:  08/14/25            Pt will transfer with mod I.        Start:  07/25/25    Expected End:  08/14/25            Pt will ambulate >/=100 ft independently.       Start:  07/25/25    Expected End:  08/14/25                   Education Documentation  Mobility Training, taught by Farida Currie, PT at 7/25/2025  2:19 PM.  Learner: Patient  Readiness: Acceptance  Method:  Explanation  Response: Needs Reinforcement  Comment: Fall precautions, PT role & POC    Education Comments  No comments found.

## 2025-07-25 NOTE — PROGRESS NOTES
Occupational Therapy    Evaluation    Patient Name: Liv Saucedo  MRN: [id number]  Department: [facility]  Room: 15/15-  Today's Date: 7/25/2025  Time Calculation  Start Time: 0842  Stop Time: 0852  Time Calculation (min): 10 min        Assessment:  OT Assessment: pt demonstrates dificulty with ADLs, functional mobility, decreased endurance, decreased balance, and generalized weakness. pt is a high fall risk and would benefit from the use of FWW to elimintate risk of falls. pt would benefit from skilled OT to facilitate PLOF and promote safety.  Prognosis: Good  Barriers to Discharge Home: No anticipated barriers  Evaluation/Treatment Tolerance: Patient tolerated treatment well  Medical Staff Made Aware: Yes  End of Session Communication: Bedside nurse  End of Session Patient Position: Bed, 3 rail up, Alarm off, caregiver present (sitter present)  OT Assessment Results: Decreased ADL status, Decreased safe judgment during ADL, Decreased cognition, Decreased endurance, Decreased functional mobility, Decreased gross motor control, Decreased trunk control for functional activities  Prognosis: Good  Evaluation/Treatment Tolerance: Patient tolerated treatment well  Medical Staff Made Aware: Yes  Strengths: Support and attitude of living partners, Premorbid level of function  Barriers to Participation: Comorbidities  Plan:  Treatment Interventions: ADL retraining, Functional transfer training, Endurance training, Cognitive reorientation, Patient/family training, Equipment evaluation/education, Compensatory technique education  OT Frequency: 3 times per week (return to [facility])  OT Discharge Recommendations: Low intensity level of continued care  Equipment Recommended upon Discharge: Wheeled walker  OT Recommended Transfer Status: Assist of 1 (AD)  OT - OK to Discharge: Yes  Treatment Interventions: ADL retraining, Functional transfer training, Endurance training, Cognitive reorientation, Patient/family training,  "Equipment evaluation/education, Compensatory technique education    Subjective   Current Problem:  1. Syncope, unspecified syncope type        2. Facial droop        3. TIA (transient ischemic attack)  Transthoracic Echo Complete    Transthoracic Echo Complete    aspirin 81 mg chewable tablet    clopidogrel (Plavix) 75 mg tablet      4. Other transient cerebral ischemic attacks and related syndromes  Transthoracic Echo Complete        OT Visit Info:  OT Received On: 07/25/25  General:  General  Reason for Referral: impaired ADLs; near syncope  Referred By: Bird Lee MD  Past Medical History Relevant to Rehab: vascular dementia, prediabetes, hypertension, hyperlipidemia, anxiety, GERD, osteoporosis, overactive bladder, OA,  Family/Caregiver Present: No  Prior to Session Communication: Bedside nurse  Patient Position Received: Up in bathroom (with nursing (sitter))  General Comment: Patient lives in memory unit at [facility].  According to the patient she got frustrated.  Per nursing home staff patient had an episode of near syncope. She was brought in hospital for further evaluation treatment. According to NP in the ED patient had an episode of right facial droop, and had funny feeling around her lips. pt cleared by nursing  Precautions:  Medical Precautions: Fall precautions     Date/Time Vitals Session Patient Position Pulse Resp SpO2 BP MAP (mmHg)    07/25/25 0828 --  --  72  18  95 %  160/83  104            Pain:  Pain Assessment  Pain Assessment: 0-10  0-10 (Numeric) Pain Score: 0 - No pain    Objective   Cognition:  Overall Cognitive Status: Impaired at baseline (A&O x2 at baseline)  Orientation Level: Disoriented to time, Disoriented to situation, Disoriented to place (pt reports we are at [place]\" and the date is  \"summer\")  Following Commands: Follows one step commands with repetition  Safety Judgment: Decreased awareness of need for assistance  Cognition Comments: flat affect. poor " "historian  Safety/Judgement: Exceptions to WFL  Insight: Moderate  Impulsive: Mildly  Processing Speed: Delayed           Home Living:  Type of Home: Memory Care unit ([facility])  Home Adaptive Equipment: None  Home Living Comments: pt unable to provide setup at [facility]. she reports \"I haven't paid attention, it hasn't been long\"  Prior Function:  Level of [place]: Independent with ADLs and functional transfers, Independent with homemaking with ambulation  Receives Help From: Family ()  Hand Dominance: Right  Prior Function Comments: pt reports she is independent at [facility], reports they do not cook/clean for her - questionable. pt denies hx of falls    ADL:  Eating Assistance: Stand by  Grooming Assistance: Stand by  Bathing Assistance: Moderate  UE Dressing Assistance: Minimal  LE Dressing Assistance: Moderate  Toileting Assistance with Device: Minimal  ADL Comments: ADLs anticipated  Activity Tolerance:  Endurance: Tolerates less than 10 min exercise, no significant change in vital signs    Bed Mobility/Transfers: Bed Mobility  Bed Mobility: Yes  Bed Mobility 1  Bed Mobility 1: Sitting to supine  Level of Assistance 1: Close supervision  Bed Mobility Comments 1: HOB elevated. supervision for safety. pt able to get BLEs in to bed. cues for sequencing    Transfers  Transfer: Yes  Transfer 1  Technique 1: Stand to sit  Transfer Level of Assistance 1: Close supervision  Trials/Comments 1: supervision for safety, balance/steadying. cues for sequencing    Functional Mobility:  Functional Mobility  Functional Mobility Performed: Yes  Functional Mobility 1  Surface 1: Level tile  Device 1: No device  Assistance 1: Minimum assistance  Comments 1: pt performed functional mobility bathroom>bed no AD. pt with small LOB during turn but able to self recover. assist for balance/steadying. pt would benefit from use of AD to promote safety    Sitting Balance:  Static Sitting Balance  Static Sitting-Balance " Support: Feet supported  Static Sitting-Level of Assistance: Close supervision    Standing Balance:  Static Standing Balance  Static Standing-Balance Support: No upper extremity supported  Static Standing-Level of Assistance: Minimum assistance     Vision:Vision - Basic Assessment  Current Vision: Wears glasses all the time  Sensation:  Sensation Comment: pt denies numbness/tingling  Strength:  Strength Comments: BUE >3+/5    Coordination:  Movements are Fluid and Coordinated: Yes   Hand Function:  Gross Grasp: Functional  Coordination: Functional  Extremities: RUE   RUE : Within Functional Limits and LUE   LUE: Within Functional Limits    Outcome Measures:[facility] Daily Activity  Putting on and taking off regular lower body clothing: A lot  Bathing (including washing, rinsing, drying): A lot  Putting on and taking off regular upper body clothing: A little  Toileting, which includes using toilet, bedpan or urinal: A little  Taking care of personal grooming such as brushing teeth: A little  Eating Meals: A little  Daily Activity - Total Score: 16    Education Documentation  Body Mechanics, taught by Merlyn Lewis OT at 7/25/2025  9:19 AM.  Learner: Patient  Readiness: Acceptance  Method: Explanation  Response: Verbalizes Understanding  Comment: Educated safety, OT POC    ADL Training, taught by Merlyn Lewis OT at 7/25/2025  9:19 AM.  Learner: Patient  Readiness: Acceptance  Method: Explanation  Response: Verbalizes Understanding  Comment: Educated safety, OT POC    Education Comments  No comments found.      Goals:  Encounter Problems       Encounter Problems (Active)       ADLs       Patient will perform UB and LB bathing with independent level of assistance.       Start:  07/25/25    Expected End:  08/15/25            Patient with complete upper body dressing with independent level of assistance donning and doffing all UE clothes       Start:  07/25/25    Expected End:  08/15/25            Patient with complete lower body  dressing with independent level of assistance donning and doffing all LE clothes       Start:  07/25/25    Expected End:  08/15/25            Patient will complete daily grooming tasks with independent level of assistance.       Start:  07/25/25    Expected End:  08/15/25            Patient will complete toileting including hygiene clothing management/hygiene with independent level of assistance.       Start:  07/25/25    Expected End:  08/15/25               TRANSFERS       Patient will complete functional transfer with least restrictive device with modified independent level of assistance.       Start:  07/25/25    Expected End:  08/15/25

## 2025-07-25 NOTE — PROGRESS NOTES
07/25/25 1309   Discharge Planning   Type of Post Acute Facility Services Assisted living  ([facility])   Expected Discharge Disposition Home  ([facility] Assisted Living [facility] unit)   Does the patient need discharge transport arranged? Yes   [employer] coordination needed? Yes     Plan is discharge back to [facility] living memory care unit today.  Family requests wheelchair transport. Family would also like C PT/OT at the AL.  Referral sent to [facility] which [facility] agency [facility]. Confirmed with daughter Nunu that [facility] is [facility].       UPDATE:  Accepted by [facility].  AVS, DC summary and [facility] referral sent in [facility]. Wheelchair transportation arranged for 1530 .  Stacey AMEZCUA aware.  Family notified via phone.

## 2025-07-26 LAB
ATRIAL RATE: 61 BPM
P AXIS: 72 DEGREES
P OFFSET: 143 MS
P ONSET: 88 MS
PR INTERVAL: 250 MS
Q ONSET: 213 MS
QRS COUNT: 10 BEATS
QRS DURATION: 78 MS
QT INTERVAL: 394 MS
QTC CALCULATION(BAZETT): 396 MS
QTC FREDERICIA: 395 MS
R AXIS: -65 DEGREES
T AXIS: 69 DEGREES
T OFFSET: 410 MS
VENTRICULAR RATE: 61 BPM

## 2025-07-28 ENCOUNTER — PATIENT OUTREACH (OUTPATIENT)
Dept: PRIMARY CARE | Facility: CLINIC | Age: 85
End: 2025-07-28
Payer: MEDICARE

## 2025-07-28 NOTE — PROGRESS NOTES
Discharge facility: [facility]  Discharge diagnosis:  Syncope, unspecified syncope type   Issues Requiring Follow-Up  Dementia  Hypertension  TIA      Admission date: 7/24/25  Discharge date: 7/25/25    PCP Appointment Date: 8/20/25  Specialist Appointment Date:   Hospital Encounter and Summary: Linked    ED to Hosp-Admission (Discharged) with Bird Lee MD; Marty R Lejeune, DO (07/24/2025)     See Discharge assessment below for further details    Spoke with spouse who states he was busy and would call back later. Reports he takes his wife to the office to see Dr Short.    2:01pm, Message left for spouse to return call regarding follow up call post hospitalization.

## 2025-07-29 ENCOUNTER — PATIENT OUTREACH (OUTPATIENT)
Dept: PRIMARY CARE | Facility: CLINIC | Age: 85
End: 2025-07-29
Payer: MEDICARE

## 2025-07-29 NOTE — PROGRESS NOTES
Discharge facility: [facility]  Discharge diagnosis:  Syncope, unspecified syncope type      Issues Requiring Follow-Up  Dementia  Hypertension  TIA    Admission date: 7/24/25  Discharge date: 7/25/25    PCP Appointment Date: 8/20/25, Unsuccessful attempts x2 to reach patient for PCP Follow-up, message sent to office  Specialist Appointment Date: none noted in EMR  Hospital Encounter and Summary: Linked     ED to Hosp-Admission (Discharged) with Bird Lee MD; Marty R Lejeune, DO (07/24/2025)      Three attempts were made to reach patient within two business days after discharge. Left voicemail with contact information for patient to call regarding follow up from hospitalization

## 2025-08-04 NOTE — DOCUMENTATION CLARIFICATION NOTE
"    PATIENT:               [facility] CHACON  [id number]  MRN:                       [id number]  :                       1940  ADMIT DATE:       2025 9:58 AM  DISCH DATE:        2025 3:31 PM  RESPONDING PROVIDER #:        [id number]          PROVIDER RESPONSE TEXT:    Patient admitted with syncopy, TIA ruled out    CDI QUERY TEXT:    Clarification    :    Instruction:    Based on your assessment of the patient and the clinical information, please provide the requested documentation by clicking on the appropriate radio button and enter any additional information if prompted.    Question: Is there a diagnosis indicative of the lab values or image study    When answering this query, please exercise your independent professional judgment. The fact that a question is being asked, does not imply that any particular answer is desired or expected.    The patient's clinical indicators include:  Clinical Information:  Discharge Summary 25 by Dr. Jeremias Lee:    \"85 years old female past medical history of vascular dementia, prediabetes, hypertension hyperlipidemia anxiety.  Patient presented to [facility] ER concerning for near syncope.\"    Clinical Indicators:    Discharge Summary 25 by Dr. Jeremias Lee:    -\"Transient ischemic attack  Patient had right facial numbness in ED.  CT brain no acute intracranial finding \"    -\"Teleneuro stroke was contacted.  Patient had CT head.  No acute intracranial finding.    Teleneuro stroke physician recommended aspirin and and Plavix for 21 days and continue aspirin 81 daily indefinitely.  Patient had MRI brain.  MRI brain no evidence of acute infarct or intracranial masses or midline shift.  There are concerning for chronic small vessel ischemic changes presence of old small infarction are not included.  CT angio head and neck no evidence of stenosis.\"    Treatment: Consult Neuro, CT brain, MRI brain, Plavix for 21 days and continue " aspirin 81 daily indefinitely    Risk Factors: chronic small vessel ischemic changes,  hypertension, hyperlipidemia  Options provided:  -- Patient admitted with TIA  -- Patient admitted with syncopy, TIA ruled out  -- Other - I will add my own diagnosis  -- Refer to Clinical Documentation Reviewer    Query created by: Joyce Roberts on 7/31/2025 11:13 AM      Electronically signed by:  DARRYL SKINNER MD 8/4/2025 1:24 PM

## 2025-08-08 ENCOUNTER — PATIENT OUTREACH (OUTPATIENT)
Dept: PRIMARY CARE | Facility: CLINIC | Age: 85
End: 2025-08-08
Payer: MEDICARE

## 2025-08-08 NOTE — PROGRESS NOTES
Confirmation of at least 2 patient identifiers.    Completed telephonic follow-up with patient approximately 14 days post discharge, no PCP follow up.   Spoke to  patient's spouse during outreach call.    Spouse/Patient reports feeling: Improved      Spouse/Patient has questions or concerns about medications: No    Have all prescribed medications been filled? Yes    Patient has necessary resources to manage their care? Yes  Patient resides in memory care unit.    Spouse/Patient has questions or concerns? No    Spouse reports patient has had no further episodes and is doing better.  Next care management follow-up approximately within one month.  Care  information provided to patient.

## 2025-08-20 ENCOUNTER — OFFICE VISIT (OUTPATIENT)
Dept: PRIMARY CARE | Facility: CLINIC | Age: 85
End: 2025-08-20
Payer: MEDICARE

## 2025-08-20 VITALS
BODY MASS INDEX: 22.26 KG/M2 | WEIGHT: 121 LBS | OXYGEN SATURATION: 99 % | DIASTOLIC BLOOD PRESSURE: 70 MMHG | TEMPERATURE: 97.6 F | SYSTOLIC BLOOD PRESSURE: 138 MMHG | HEIGHT: 62 IN | HEART RATE: 56 BPM

## 2025-08-20 DIAGNOSIS — F03.94 DEMENTIA WITH ANXIETY, UNSPECIFIED DEMENTIA SEVERITY, UNSPECIFIED DEMENTIA TYPE (MULTI): ICD-10-CM

## 2025-08-20 DIAGNOSIS — Z71.89 COUNSELING REGARDING ADVANCED CARE PLANNING AND GOALS OF CARE: ICD-10-CM

## 2025-08-20 DIAGNOSIS — E53.8 VITAMIN B12 DEFICIENCY: ICD-10-CM

## 2025-08-20 DIAGNOSIS — E78.2 MIXED HYPERLIPIDEMIA: ICD-10-CM

## 2025-08-20 DIAGNOSIS — K21.9 GASTROESOPHAGEAL REFLUX DISEASE WITHOUT ESOPHAGITIS: ICD-10-CM

## 2025-08-20 DIAGNOSIS — M15.0 PRIMARY OSTEOARTHRITIS INVOLVING MULTIPLE JOINTS: ICD-10-CM

## 2025-08-20 DIAGNOSIS — N32.81 OAB (OVERACTIVE BLADDER): ICD-10-CM

## 2025-08-20 DIAGNOSIS — G47.33 OSA (OBSTRUCTIVE SLEEP APNEA): ICD-10-CM

## 2025-08-20 DIAGNOSIS — Z00.00 ANNUAL PHYSICAL EXAM: Primary | ICD-10-CM

## 2025-08-20 DIAGNOSIS — I50.32 CHRONIC DIASTOLIC HEART FAILURE: ICD-10-CM

## 2025-08-20 DIAGNOSIS — I10 PRIMARY HYPERTENSION: ICD-10-CM

## 2025-08-20 DIAGNOSIS — Z23 ENCOUNTER FOR IMMUNIZATION: ICD-10-CM

## 2025-08-20 DIAGNOSIS — Z86.73 HISTORY OF TIA (TRANSIENT ISCHEMIC ATTACK): ICD-10-CM

## 2025-08-20 DIAGNOSIS — M81.0 AGE-RELATED OSTEOPOROSIS WITHOUT CURRENT PATHOLOGICAL FRACTURE: ICD-10-CM

## 2025-08-20 DIAGNOSIS — E22.2 SIADH (SYNDROME OF INAPPROPRIATE ADH PRODUCTION) (MULTI): ICD-10-CM

## 2025-08-20 DIAGNOSIS — E55.9 VITAMIN D DEFICIENCY: ICD-10-CM

## 2025-08-20 DIAGNOSIS — Z01.89 ENCOUNTER FOR ROUTINE LABORATORY TESTING: ICD-10-CM

## 2025-08-20 DIAGNOSIS — I67.9 CEREBROVASCULAR DISEASE: ICD-10-CM

## 2025-08-20 DIAGNOSIS — E03.8 SUBCLINICAL HYPOTHYROIDISM: ICD-10-CM

## 2025-08-20 DIAGNOSIS — F41.9 ANXIETY: ICD-10-CM

## 2025-08-20 DIAGNOSIS — E11.9 TYPE 2 DIABETES MELLITUS WITHOUT COMPLICATION, WITHOUT LONG-TERM CURRENT USE OF INSULIN: ICD-10-CM

## 2025-08-20 DIAGNOSIS — K86.89 PANCREATIC INSUFFICIENCY (HHS-HCC): ICD-10-CM

## 2025-08-20 PROCEDURE — 1159F MED LIST DOCD IN RCRD: CPT | Performed by: STUDENT IN AN ORGANIZED HEALTH CARE EDUCATION/TRAINING PROGRAM

## 2025-08-20 PROCEDURE — 1036F TOBACCO NON-USER: CPT | Performed by: STUDENT IN AN ORGANIZED HEALTH CARE EDUCATION/TRAINING PROGRAM

## 2025-08-20 PROCEDURE — 1170F FXNL STATUS ASSESSED: CPT | Performed by: STUDENT IN AN ORGANIZED HEALTH CARE EDUCATION/TRAINING PROGRAM

## 2025-08-20 PROCEDURE — 1157F ADVNC CARE PLAN IN RCRD: CPT | Performed by: STUDENT IN AN ORGANIZED HEALTH CARE EDUCATION/TRAINING PROGRAM

## 2025-08-20 PROCEDURE — 99214 OFFICE O/P EST MOD 30 MIN: CPT | Performed by: STUDENT IN AN ORGANIZED HEALTH CARE EDUCATION/TRAINING PROGRAM

## 2025-08-20 PROCEDURE — 1111F DSCHRG MED/CURRENT MED MERGE: CPT | Performed by: STUDENT IN AN ORGANIZED HEALTH CARE EDUCATION/TRAINING PROGRAM

## 2025-08-20 PROCEDURE — 3075F SYST BP GE 130 - 139MM HG: CPT | Performed by: STUDENT IN AN ORGANIZED HEALTH CARE EDUCATION/TRAINING PROGRAM

## 2025-08-20 PROCEDURE — 1160F RVW MEDS BY RX/DR IN RCRD: CPT | Performed by: STUDENT IN AN ORGANIZED HEALTH CARE EDUCATION/TRAINING PROGRAM

## 2025-08-20 PROCEDURE — 1158F ADVNC CARE PLAN TLK DOCD: CPT | Performed by: STUDENT IN AN ORGANIZED HEALTH CARE EDUCATION/TRAINING PROGRAM

## 2025-08-20 PROCEDURE — G2211 COMPLEX E/M VISIT ADD ON: HCPCS | Performed by: STUDENT IN AN ORGANIZED HEALTH CARE EDUCATION/TRAINING PROGRAM

## 2025-08-20 PROCEDURE — 99215 OFFICE O/P EST HI 40 MIN: CPT | Mod: 25 | Performed by: STUDENT IN AN ORGANIZED HEALTH CARE EDUCATION/TRAINING PROGRAM

## 2025-08-20 PROCEDURE — 3078F DIAST BP <80 MM HG: CPT | Performed by: STUDENT IN AN ORGANIZED HEALTH CARE EDUCATION/TRAINING PROGRAM

## 2025-08-20 PROCEDURE — 1123F ACP DISCUSS/DSCN MKR DOCD: CPT | Performed by: STUDENT IN AN ORGANIZED HEALTH CARE EDUCATION/TRAINING PROGRAM

## 2025-08-20 PROCEDURE — 1126F AMNT PAIN NOTED NONE PRSNT: CPT | Performed by: STUDENT IN AN ORGANIZED HEALTH CARE EDUCATION/TRAINING PROGRAM

## 2025-08-20 PROCEDURE — G0439 PPPS, SUBSEQ VISIT: HCPCS | Performed by: STUDENT IN AN ORGANIZED HEALTH CARE EDUCATION/TRAINING PROGRAM

## 2025-08-20 ASSESSMENT — ACTIVITIES OF DAILY LIVING (ADL)
TAKING_MEDICATION: INDEPENDENT
DRESSING: INDEPENDENT
BATHING: INDEPENDENT
MANAGING_FINANCES: INDEPENDENT
GROCERY_SHOPPING: INDEPENDENT
DOING_HOUSEWORK: INDEPENDENT

## 2025-08-20 ASSESSMENT — ENCOUNTER SYMPTOMS
CARDIOVASCULAR NEGATIVE: 1
NEUROLOGICAL NEGATIVE: 1
HEMATOLOGIC/LYMPHATIC NEGATIVE: 1
ENDOCRINE NEGATIVE: 1
RESPIRATORY NEGATIVE: 1
PSYCHIATRIC NEGATIVE: 1
GASTROINTESTINAL NEGATIVE: 1
MUSCULOSKELETAL NEGATIVE: 1
CONSTITUTIONAL NEGATIVE: 1
EYES NEGATIVE: 1
ALLERGIC/IMMUNOLOGIC NEGATIVE: 1

## 2025-08-20 ASSESSMENT — PAIN SCALES - GENERAL: PAINLEVEL_OUTOF10: 0-NO PAIN

## 2025-08-20 ASSESSMENT — PATIENT HEALTH QUESTIONNAIRE - PHQ9
2. FEELING DOWN, DEPRESSED OR HOPELESS: NOT AT ALL
1. LITTLE INTEREST OR PLEASURE IN DOING THINGS: NOT AT ALL
SUM OF ALL RESPONSES TO PHQ9 QUESTIONS 1 AND 2: 0